# Patient Record
Sex: FEMALE | Race: WHITE | Employment: PART TIME | URBAN - METROPOLITAN AREA
[De-identification: names, ages, dates, MRNs, and addresses within clinical notes are randomized per-mention and may not be internally consistent; named-entity substitution may affect disease eponyms.]

---

## 2017-06-19 ENCOUNTER — HOSPITAL ENCOUNTER (OUTPATIENT)
Dept: WOMENS IMAGING | Age: 47
Discharge: HOME | End: 2017-06-19
Payer: COMMERCIAL

## 2017-06-19 ENCOUNTER — HOSPITAL ENCOUNTER (OUTPATIENT)
Dept: ULTRASOUND IMAGING | Age: 47
Discharge: HOME | End: 2017-06-19
Payer: COMMERCIAL

## 2017-06-19 DIAGNOSIS — R92.8 ABNORMAL MAMMOGRAM: ICD-10-CM

## 2017-06-19 DIAGNOSIS — R92.0 ABNORMAL MAMMOGRAM WITH MICROCALCIFICATION: ICD-10-CM

## 2017-06-19 PROCEDURE — 76642 ULTRASOUND BREAST LIMITED: CPT

## 2017-06-19 PROCEDURE — G0206 DX MAMMO INCL CAD UNI: HCPCS

## 2017-07-05 ENCOUNTER — OFFICE VISIT (OUTPATIENT)
Dept: SURGERY | Age: 47
End: 2017-07-05

## 2017-07-05 VITALS
DIASTOLIC BLOOD PRESSURE: 64 MMHG | WEIGHT: 142 LBS | HEIGHT: 67 IN | TEMPERATURE: 98.8 F | BODY MASS INDEX: 22.29 KG/M2 | SYSTOLIC BLOOD PRESSURE: 112 MMHG

## 2017-07-05 DIAGNOSIS — R92.0 ABNORMAL MAMMOGRAM WITH MICROCALCIFICATION: ICD-10-CM

## 2017-07-05 DIAGNOSIS — R92.8 ABNORMAL MAMMOGRAM: ICD-10-CM

## 2017-07-05 DIAGNOSIS — Z12.31 SCREENING MAMMOGRAM, ENCOUNTER FOR: Primary | ICD-10-CM

## 2017-07-05 PROCEDURE — 99212 OFFICE O/P EST SF 10 MIN: CPT | Performed by: SURGERY

## 2017-07-05 RX ORDER — ACETAMINOPHEN 160 MG
4000 TABLET,DISINTEGRATING ORAL
COMMUNITY

## 2017-10-12 ENCOUNTER — TELEPHONE (OUTPATIENT)
Dept: SURGERY | Age: 47
End: 2017-10-12

## 2017-10-13 NOTE — TELEPHONE ENCOUNTER
She had a left breast mammogram in June which was category 3 and stable. I had suggested a bilateral mammogram in January to get her back on a regular annual schedule. I don't think it is a problem to wait until June 2018 as the left mamm in June this year was stable.

## 2017-10-18 NOTE — TELEPHONE ENCOUNTER
Patient needs letter sent for the [facility] (they pay for her mammograms) stating that she is not due for her annual mammogram until July 2018. Please advise.

## 2018-07-11 ENCOUNTER — HOSPITAL ENCOUNTER (OUTPATIENT)
Dept: WOMENS IMAGING | Age: 48
Discharge: HOME | End: 2018-07-13
Payer: COMMERCIAL

## 2018-07-11 DIAGNOSIS — Z12.31 SCREENING MAMMOGRAM, ENCOUNTER FOR: ICD-10-CM

## 2018-07-11 PROCEDURE — 77067 SCR MAMMO BI INCL CAD: CPT

## 2018-07-25 ENCOUNTER — OFFICE VISIT (OUTPATIENT)
Dept: SURGERY | Age: 48
End: 2018-07-25
Payer: COMMERCIAL

## 2018-07-25 VITALS
SYSTOLIC BLOOD PRESSURE: 116 MMHG | DIASTOLIC BLOOD PRESSURE: 80 MMHG | WEIGHT: 145 LBS | TEMPERATURE: 99.1 F | HEIGHT: 66 IN | BODY MASS INDEX: 23.3 KG/M2

## 2018-07-25 DIAGNOSIS — N60.19 FIBROCYSTIC BREAST DISEASE (FCBD), UNSPECIFIED LATERALITY: Primary | ICD-10-CM

## 2018-07-25 PROCEDURE — 99212 OFFICE O/P EST SF 10 MIN: CPT | Performed by: SURGERY

## 2018-07-25 RX ORDER — KETOCONAZOLE 20 MG/ML
SHAMPOO TOPICAL
Refills: 5 | COMMUNITY
Start: 2018-07-21

## 2019-05-29 DIAGNOSIS — Z12.31 SCREENING MAMMOGRAM, ENCOUNTER FOR: Primary | ICD-10-CM

## 2019-06-02 ENCOUNTER — HOSPITAL ENCOUNTER (EMERGENCY)
Age: 49
Discharge: HOME | End: 2019-06-02

## 2019-06-02 VITALS
HEIGHT: 67 IN | WEIGHT: 145 LBS | BODY MASS INDEX: 22.76 KG/M2 | OXYGEN SATURATION: 97 % | RESPIRATION RATE: 18 BRPM | SYSTOLIC BLOOD PRESSURE: 120 MMHG | TEMPERATURE: 98.5 F | DIASTOLIC BLOOD PRESSURE: 80 MMHG | HEART RATE: 74 BPM

## 2019-06-02 DIAGNOSIS — J02.9 ACUTE PHARYNGITIS, UNSPECIFIED ETIOLOGY: Primary | ICD-10-CM

## 2019-06-02 LAB — S PYO AG THROAT QL: NEGATIVE

## 2019-06-02 PROCEDURE — 87081 CULTURE SCREEN ONLY: CPT

## 2019-06-02 PROCEDURE — 99282 EMERGENCY DEPT VISIT SF MDM: CPT

## 2019-06-02 PROCEDURE — 87880 STREP A ASSAY W/OPTIC: CPT

## 2019-06-02 RX ORDER — AZITHROMYCIN 250 MG/1
TABLET, FILM COATED ORAL
Qty: 1 PACKET | Refills: 0 | Status: SHIPPED | OUTPATIENT
Start: 2019-06-02 | End: 2019-06-12

## 2019-06-02 ASSESSMENT — ENCOUNTER SYMPTOMS
WHEEZING: 0
NAUSEA: 0
SORE THROAT: 1
FACIAL SWELLING: 0
RHINORRHEA: 0
SINUS PRESSURE: 0
ABDOMINAL DISTENTION: 0
VOMITING: 0
STRIDOR: 0
EYE DISCHARGE: 0
VOICE CHANGE: 0
COUGH: 0
ABDOMINAL PAIN: 0
COLOR CHANGE: 0
SINUS PAIN: 0
TROUBLE SWALLOWING: 0
CONSTIPATION: 0
SHORTNESS OF BREATH: 0

## 2019-06-02 ASSESSMENT — PAIN SCALES - GENERAL: PAINLEVEL_OUTOF10: 2

## 2019-06-02 NOTE — ED PROVIDER NOTES
numbness and headaches. Psychiatric/Behavioral: Negative for agitation and confusion. Except as noted above the remainder of the review of systems was reviewed and negative. PAST MEDICAL HISTORY   No past medical history on file. SURGICALHISTORY       Past Surgical History:   Procedure Laterality Date    LAPAROSCOPY      LIVER BIOPSY      TUBAL LIGATION           CURRENT MEDICATIONS       Discharge Medication List as of 6/2/2019  8:36 AM      CONTINUE these medications which have NOT CHANGED    Details   ketoconazole (NIZORAL) 2 % shampoo USE UTD BID, R-5, Historical Med      Multiple Vitamins-Minerals (MULTIVITAMIN PO) Take by mouthHistorical Med      Cholecalciferol (VITAMIN D3) 2000 units CAPS Take 4,000 Units by mouthHistorical Med      Probiotic Product (PROBIOTIC PO) Take by mouthHistorical Med             ALLERGIES     Avelox [moxifloxacin]; Keflex [cephalexin]; and Pseudoephedrine    FAMILY HISTORY     No family history on file.        SOCIAL HISTORY       Social History     Socioeconomic History    Marital status:      Spouse name: Not on file    Number of children: Not on file    Years of education: Not on file    Highest education level: Not on file   Occupational History    Not on file   Social Needs    Financial resource strain: Not on file    Food insecurity:     Worry: Not on file     Inability: Not on file    Transportation needs:     Medical: Not on file     Non-medical: Not on file   Tobacco Use    Smoking status: Current Every Day Smoker    Smokeless tobacco: Never Used   Substance and Sexual Activity    Alcohol use: Not on file    Drug use: Not on file    Sexual activity: Not on file   Lifestyle    Physical activity:     Days per week: Not on file     Minutes per session: Not on file    Stress: Not on file   Relationships    Social connections:     Talks on phone: Not on file     Gets together: Not on file     Attends [employer]al service: Not on file the radiologist. Plain radiographic images are visualized and preliminarily interpreted by the emergency physician with the below findings:        Interpretation per the Radiologist below, if available at the time ofthis note:    No orders to display         ED BEDSIDE ULTRASOUND:   Performed by ED Physician - none    LABS:  Labs Reviewed   RAPID STREP SCREEN   CULTURE BETA STREP CONFIRM PLATE       All other labs were within normal range or not returned as of this dictation. EMERGENCY DEPARTMENT COURSE and DIFFERENTIAL DIAGNOSIS/MDM:   Vitals:    Vitals:    06/02/19 0815   BP: 120/80   Pulse: 74   Resp: 18   Temp: 98.5 °F (36.9 °C)   TempSrc: Oral   SpO2: 97%   Weight: 145 lb (65.8 kg)   Height: 5' 7\" (1.702 m)          MDM  Number of Diagnoses or Management Options  Acute pharyngitis, unspecified etiology:   Diagnosis management comments: Patient sent to the emergency department by her employer for strep tests that she's had ongoing sore throat for the last 3 days patient has patient care contacts and they were advised to be evaluated. Patient has mild posterior pharyngeal erythema without signs of exudates no signs of abscess uvula is midline. No difficulty swallowing or speaking. She was given a prescription for Zithromax and advised to follow up with regular family physician next 2-3 days for reevaluation. Patient states she does not respond well to amoxicillin and therefore she requested Zithromax for treatment. CRITICAL CARE TIME   Total Critical Care time was 0 minutes, excluding separately reportableprocedures. There was a high probability of clinicallysignificant/life threatening deterioration in the patient's condition which required my urgent intervention. CONSULTS:  None    PROCEDURES:  Unless otherwise noted below, none     Procedures    FINAL IMPRESSION      1.  Acute pharyngitis, unspecified etiology          DISPOSITION/PLAN   DISPOSITION Decision To Discharge 06/02/2019 08:30:31

## 2019-06-02 NOTE — ED TRIAGE NOTES
Pt  Sent in from work to have a strep test. Pt states that she has had strep in the past  But this does not seem  Like it. Uncomfortable to swallow and she states that she  Has seen a few  White patches. Pt feels more as if there is inflammation than soreness.    Respirations unlabored

## 2019-06-04 LAB — S PYO THROAT QL CULT: NORMAL

## 2019-07-24 ENCOUNTER — HOSPITAL ENCOUNTER (OUTPATIENT)
Dept: ULTRASOUND IMAGING | Age: 49
Discharge: HOME | End: 2019-07-26
Payer: COMMERCIAL

## 2019-07-24 ENCOUNTER — HOSPITAL ENCOUNTER (OUTPATIENT)
Dept: WOMENS IMAGING | Age: 49
Discharge: HOME | End: 2019-07-26
Payer: COMMERCIAL

## 2019-07-24 DIAGNOSIS — Z87.2 HISTORY OF DIMPLING OF BREAST SKIN: ICD-10-CM

## 2019-07-24 DIAGNOSIS — R23.4 CHANGE OF SKIN OF BREAST: ICD-10-CM

## 2019-07-24 DIAGNOSIS — Z12.31 SCREENING MAMMOGRAM, ENCOUNTER FOR: ICD-10-CM

## 2019-07-24 DIAGNOSIS — N64.4 PAIN OF LEFT BREAST: ICD-10-CM

## 2019-07-24 PROCEDURE — G0279 TOMOSYNTHESIS, MAMMO: HCPCS

## 2019-07-24 PROCEDURE — 76642 ULTRASOUND BREAST LIMITED: CPT

## 2019-07-25 ENCOUNTER — TELEPHONE (OUTPATIENT)
Dept: SURGERY | Age: 49
End: 2019-07-25

## 2019-07-26 ENCOUNTER — OFFICE VISIT (OUTPATIENT)
Dept: SURGERY | Age: 49
End: 2019-07-26
Payer: MEDICAID

## 2019-07-26 VITALS
DIASTOLIC BLOOD PRESSURE: 82 MMHG | BODY MASS INDEX: 23.2 KG/M2 | TEMPERATURE: 98 F | WEIGHT: 147.8 LBS | HEIGHT: 67 IN | SYSTOLIC BLOOD PRESSURE: 110 MMHG

## 2019-07-26 DIAGNOSIS — R92.8 ABNORMAL ULTRASOUND OF BREAST: Primary | ICD-10-CM

## 2019-07-26 PROCEDURE — 99213 OFFICE O/P EST LOW 20 MIN: CPT | Performed by: SURGERY

## 2019-07-26 RX ORDER — HYDROCORTISONE ACETATE 25 MG/1
SUPPOSITORY RECTAL
Refills: 0 | COMMUNITY
Start: 2019-05-30

## 2019-07-29 ENCOUNTER — HOSPITAL ENCOUNTER (OUTPATIENT)
Dept: ULTRASOUND IMAGING | Age: 49
Discharge: HOME | End: 2019-07-31
Payer: COMMERCIAL

## 2019-07-29 ENCOUNTER — PREP FOR PROCEDURE (OUTPATIENT)
Dept: WOMENS IMAGING | Age: 49
End: 2019-07-29

## 2019-07-29 VITALS — SYSTOLIC BLOOD PRESSURE: 111 MMHG | HEART RATE: 80 BPM | DIASTOLIC BLOOD PRESSURE: 73 MMHG | RESPIRATION RATE: 20 BRPM

## 2019-07-29 DIAGNOSIS — R92.8 ABNORMAL ULTRASOUND OF BREAST: ICD-10-CM

## 2019-07-29 PROCEDURE — 2500000003 HC RX 250 WO HCPCS: Performed by: SURGERY

## 2019-07-29 PROCEDURE — 88305 TISSUE EXAM BY PATHOLOGIST: CPT

## 2019-07-29 PROCEDURE — 2580000003 HC RX 258: Performed by: SURGERY

## 2019-07-29 PROCEDURE — 2709999900 US BREAST BIOPSY W LOC DEVICE 1ST LESION LEFT

## 2019-07-29 RX ORDER — LIDOCAINE HYDROCHLORIDE 20 MG/ML
20 INJECTION, SOLUTION INFILTRATION; PERINEURAL ONCE
Status: COMPLETED | OUTPATIENT
Start: 2019-07-29 | End: 2019-07-29

## 2019-07-29 RX ORDER — LIDOCAINE HYDROCHLORIDE 20 MG/ML
20 INJECTION, SOLUTION INFILTRATION; PERINEURAL ONCE
OUTPATIENT
Start: 2019-07-29 | End: 2019-07-29

## 2019-07-29 RX ORDER — SODIUM CHLORIDE 9 MG/ML
INJECTION, SOLUTION INTRAVENOUS CONTINUOUS
OUTPATIENT
Start: 2019-07-29

## 2019-07-29 RX ORDER — MAGNESIUM HYDROXIDE 1200 MG/15ML
250 LIQUID ORAL CONTINUOUS
Status: DISCONTINUED | OUTPATIENT
Start: 2019-07-29 | End: 2019-08-01 | Stop reason: HOSPADM

## 2019-07-29 RX ADMIN — LIDOCAINE HYDROCHLORIDE 20 ML: 20 INJECTION, SOLUTION INFILTRATION; PERINEURAL at 15:00

## 2019-07-29 RX ADMIN — SODIUM CHLORIDE 250 ML: 900 IRRIGANT IRRIGATION at 14:46

## 2019-07-29 ASSESSMENT — PAIN SCALES - GENERAL: PAINLEVEL_OUTOF10: 0

## 2019-08-01 ENCOUNTER — TELEPHONE (OUTPATIENT)
Dept: SURGERY | Age: 49
End: 2019-08-01

## 2019-08-05 ENCOUNTER — OFFICE VISIT (OUTPATIENT)
Dept: SURGERY | Age: 49
End: 2019-08-05

## 2019-08-05 ENCOUNTER — TELEPHONE (OUTPATIENT)
Dept: SURGERY | Age: 49
End: 2019-08-05

## 2019-08-05 VITALS
BODY MASS INDEX: 23.64 KG/M2 | SYSTOLIC BLOOD PRESSURE: 120 MMHG | DIASTOLIC BLOOD PRESSURE: 80 MMHG | HEIGHT: 67 IN | TEMPERATURE: 98.4 F | WEIGHT: 150.6 LBS

## 2019-08-05 DIAGNOSIS — R92.8 ABNORMAL ULTRASOUND OF BREAST: Primary | ICD-10-CM

## 2019-08-05 PROCEDURE — 99024 POSTOP FOLLOW-UP VISIT: CPT | Performed by: SURGERY

## 2019-08-05 NOTE — LETTER
Cecy Arias MD  [address]  [facility], [address]  Phone: [phone]  Fax: [phone]      Re: Sydney Mast  : 1970      To Whom It May Concern:      Donovan Ashby was here today for an appointment with Cecy Arias MD.    Please call with any questions or concerns.     Thank you,        Cecy Arias MD  Electronically signed by Lebron Libman, 11:04 AM 19

## 2019-08-12 ENCOUNTER — OFFICE VISIT (OUTPATIENT)
Dept: SURGERY | Age: 49
End: 2019-08-12
Payer: MEDICAID

## 2019-08-12 VITALS
RESPIRATION RATE: 18 BRPM | BODY MASS INDEX: 23.89 KG/M2 | DIASTOLIC BLOOD PRESSURE: 66 MMHG | HEIGHT: 67 IN | SYSTOLIC BLOOD PRESSURE: 121 MMHG | HEART RATE: 85 BPM | WEIGHT: 152.2 LBS

## 2019-08-12 DIAGNOSIS — N63.20 LEFT BREAST MASS: Primary | ICD-10-CM

## 2019-08-12 DIAGNOSIS — R92.8 ABNORMAL MAMMOGRAM OF LEFT BREAST: ICD-10-CM

## 2019-08-12 PROCEDURE — 19083 BX BREAST 1ST LESION US IMAG: CPT | Performed by: SURGERY

## 2019-08-12 PROCEDURE — 99215 OFFICE O/P EST HI 40 MIN: CPT | Performed by: SURGERY

## 2019-08-12 PROCEDURE — 76642 ULTRASOUND BREAST LIMITED: CPT | Performed by: SURGERY

## 2019-08-12 RX ADMIN — Medication 2 MEQ: at 10:25

## 2019-08-12 RX ADMIN — LIDOCAINE HYDROCHLORIDE 10 ML: 10 INJECTION, SOLUTION EPIDURAL; INFILTRATION; INTRACAUDAL; PERINEURAL at 10:25

## 2019-08-12 ASSESSMENT — ENCOUNTER SYMPTOMS
VOMITING: 0
SHORTNESS OF BREATH: 0
NAUSEA: 0
SORE THROAT: 0
CHEST TIGHTNESS: 0
COLOR CHANGE: 0
ABDOMINAL PAIN: 0
COUGH: 0

## 2019-08-12 NOTE — PATIENT INSTRUCTIONS
picture of the breast can be seen on a video monitor. · A needle or tiny probe is put through your skin into your breast tissue. · If the lump is a cyst, the needle will take out fluid. If the lump is solid, the needle will take a sample of tissue. · The needle is removed and pressure put on the needle site to stop any bleeding. The area is covered with a bandage. What else should you know about the test?  · Ultrasound is painless and does not use radiation. · You will feel only a quick sting from the needle if you have a local anesthetic to numb the biopsy area. You may feel some pressure when the biopsy needle is put in. How long does the test take? · The test usually takes about 15 minutes. This depends on how many biopsy samples are needed. What happens after the test?  · You'll be told how long it may take to get your results back. · You will probably be able to go home right away. · You can go back to your usual activities right away, but avoid heavy lifting for 24 hours. · The site may be tender for 2 or 3 days. You may also have some bruising, swelling, or slight bleeding. ? You can use an ice pack. Put ice or a cold pack on the area for 10 to 20 minutes at a time. Put a thin cloth between the ice and your skin. ? Ask your doctor if you can take an over-the-counter pain medicine, such as acetaminophen (Tylenol), ibuprofen (Advil, Motrin), or naproxen (Aleve). Be safe with medicines. Read and follow all instructions on the label. · After a specialist looks at the biopsy sample for signs of cancer, your doctor's office will let you know the results. · If the test results are not clear, you may have another biopsy or test.  Follow-up care is a key part of your treatment and safety. Be sure to make and go to all appointments, and call your doctor if you are having problems. It's also a good idea to keep a list of the medicines you take.  Ask your doctor when you can expect to have your test

## 2019-08-12 NOTE — PROGRESS NOTES
Needs    Financial resource strain: Not on file    Food insecurity:     Worry: Not on file     Inability: Not on file    Transportation needs:     Medical: Not on file     Non-medical: Not on file   Tobacco Use    Smoking status: Former Smoker     Packs/day: 0.50     Years: 30.00     Pack years: 15.00     Last attempt to quit: 2019     Years since quittin.2    Smokeless tobacco: Never Used   Substance and Sexual Activity    Alcohol use: Yes     Comment: Occassional    Drug use: Never    Sexual activity: Yes     Partners: Male   Lifestyle    Physical activity:     Days per week: Not on file     Minutes per session: Not on file    Stress: Not on file   Relationships    Social connections:     Talks on phone: Not on file     Gets together: Not on file     Attends Zoroastrianism service: Not on file     Active member of club or organization: Not on file     Attends meetings of clubs or organizations: Not on file     Relationship status: Not on file    Intimate partner violence:     Fear of current or ex partner: Not on file     Emotionally abused: Not on file     Physically abused: Not on file     Forced sexual activity: Not on file   Other Topics Concern    Not on file   Social History Narrative    Not on file     Goes to the [facility] because the patient does not have insurance  Review of Systems   Constitutional: Negative for activity change, appetite change, chills, diaphoresis, fatigue, fever and unexpected weight change. HENT: Negative for congestion, ear pain, hearing loss, mouth sores, nosebleeds and sore throat. Respiratory: Negative for cough, chest tightness and shortness of breath. Cardiovascular: Negative for chest pain, palpitations and leg swelling. Gastrointestinal: Negative for abdominal pain, nausea and vomiting. Endocrine: Negative for cold intolerance, heat intolerance, polydipsia, polyphagia and polyuria.    Genitourinary: Negative for difficulty urinating, menstrual problem and vaginal bleeding. Musculoskeletal: Negative for neck pain and neck stiffness. Skin: Negative for color change, pallor, rash and wound. Allergic/Immunologic: Negative for environmental allergies and immunocompromised state. Neurological: Negative for weakness. Hematological: Does not bruise/bleed easily. Psychiatric/Behavioral: Negative for agitation, confusion, sleep disturbance and suicidal ideas. The patient is nervous/anxious (depression and anxiety). Have you ever tested positive for AIDS? no  Have you ever tested positive for Hepatitis? 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 She has a 15.00 pack-year smoking history. She has never used smokeless tobacco.  Alcohol use: Norbert Woods  reports that she drinks alcohol. Drug use: Norbert Woods  reports that she does not use drugs. Caffeine intake: 0 cups of caffeinated coffee per day(s)  Exercise:  very active    /66 (Site: Right Upper Arm)   Pulse 85   Resp 18   Ht 5' 7\" (1.702 m)   Wt 152 lb 3.2 oz (69 kg)   LMP 07/10/2019   BMI 23.84 kg/m²     Physical Exam   Constitutional: She is oriented to person, place, and time. She appears well-developed and well-nourished. She is cooperative. HENT:   Head: Normocephalic and atraumatic. Nose: Nose normal.   Eyes: Conjunctivae are normal. Right conjunctiva has no hemorrhage. Left conjunctiva has no hemorrhage. Neck: Normal range of motion. Neck supple. Cardiovascular: Normal rate. Pulmonary/Chest: Effort normal. No respiratory distress. Right breast exhibits no inverted nipple, no mass, no nipple discharge, no skin change and no tenderness. Left breast exhibits mass (1.5 cm mass at 5 oclock 3 cm from nipple, irregular with skin retraction, ecchymosis from previous biopsy) and skin change.  Left breast

## 2019-08-13 ENCOUNTER — TELEPHONE (OUTPATIENT)
Dept: SURGERY | Age: 49
End: 2019-08-13

## 2019-08-13 RX ORDER — LIDOCAINE HYDROCHLORIDE 10 MG/ML
10 INJECTION, SOLUTION EPIDURAL; INFILTRATION; INTRACAUDAL; PERINEURAL ONCE
Status: COMPLETED | OUTPATIENT
Start: 2019-08-13 | End: 2019-08-12

## 2019-08-13 NOTE — PROGRESS NOTES
A timeout was performed immediately prior to the start of the left breast core biopsy and clip placement procedure and included the correct patient (two identifiers), correct procedure and correct site(s). Procedure consent and allergies were also verified.

## 2019-08-14 ENCOUNTER — TELEPHONE (OUTPATIENT)
Dept: SURGERY | Age: 49
End: 2019-08-14

## 2019-08-15 ENCOUNTER — TELEPHONE (OUTPATIENT)
Dept: SURGERY | Age: 49
End: 2019-08-15

## 2019-08-15 NOTE — TELEPHONE ENCOUNTER
Spoke to patient yesterday in detail. Offered Friday am appointment for possible surgery next week. Patient declined and wants next week. The patient has issues with work.

## 2019-08-21 ENCOUNTER — OFFICE VISIT (OUTPATIENT)
Dept: SURGERY | Age: 49
End: 2019-08-21
Payer: MEDICAID

## 2019-08-21 ENCOUNTER — TELEPHONE (OUTPATIENT)
Dept: SURGERY | Age: 49
End: 2019-08-21

## 2019-08-21 VITALS
HEART RATE: 83 BPM | SYSTOLIC BLOOD PRESSURE: 132 MMHG | BODY MASS INDEX: 23.79 KG/M2 | DIASTOLIC BLOOD PRESSURE: 60 MMHG | WEIGHT: 151.6 LBS | HEIGHT: 67 IN | RESPIRATION RATE: 18 BRPM

## 2019-08-21 DIAGNOSIS — Z17.0 CARCINOMA OF LOWER-OUTER QUADRANT OF LEFT BREAST IN FEMALE, ESTROGEN RECEPTOR POSITIVE (HCC): Primary | ICD-10-CM

## 2019-08-21 DIAGNOSIS — C50.512 CARCINOMA OF LOWER-OUTER QUADRANT OF LEFT BREAST IN FEMALE, ESTROGEN RECEPTOR POSITIVE (HCC): Primary | ICD-10-CM

## 2019-08-21 PROCEDURE — 99215 OFFICE O/P EST HI 40 MIN: CPT | Performed by: SURGERY

## 2019-08-21 NOTE — PROGRESS NOTES
CANCER TALK    PATIENT:  Nimisha Gonzalez    DATE:     8/21/19    SURGICAL DISCUSSION:    Enio Hart is a 54y.o. year old  female who presents for a discussion left breast cancer. We underwent a description of the pathology of her tumor, the clinical stage, her treatment options of breast conservation versus simple mastectomy and sentinel lymph node biopsy. These were all discussed with her. The patient is aware that by having a sentinel lymph node if the sentinel lymph node is negative on frozen section and the final pathology is positive she will need to return to the Operating Room. She is also aware there is a 5% chance that the sentinel lymph node may not be identified at surgery and that she may need to have a completion axillary dissection. There is a 3-5% chance of a false/negative finding on sentinel lymph node biopsy. The indications for chemotherapy and radiation therapy were also discussed. The patient has undergone explanation of the complications of the procedures which are including but not limited to bleeding, infection, nerve injury and lymphedema. The variation in lymphedema rates between sentinel lymph node biopsy and the completion axillary dissection were discussed. The patient is aware that if she meets all the criteria of having clear margins that the survival and local recurrence rate for mastectomy and breast conservation are the same. The potential risk of local recurrence is 5-6%. She is aware that if her margins return positive on the lumpectomy specimen that she would need to have either a re-excision for margins or a completion mastectomy. Multiple questions were answered and the patient wanted to schedule a lumpectomy and sentinel lymph node biopsy at the next available time. Greater than 50% of the time spent was reviewing management options with the patient. Approximately 60 minutes was spent with the patient and family.       IMPRESSION:    Cancer

## 2019-08-22 ENCOUNTER — TELEPHONE (OUTPATIENT)
Dept: SURGERY | Age: 49
End: 2019-08-22

## 2019-08-26 ENCOUNTER — NURSE ONLY (OUTPATIENT)
Dept: SURGERY | Age: 49
End: 2019-08-26
Payer: MEDICAID

## 2019-08-26 ENCOUNTER — HOSPITAL ENCOUNTER (OUTPATIENT)
Dept: OCCUPATIONAL THERAPY | Age: 49
Setting detail: THERAPIES SERIES
Discharge: HOME | End: 2019-08-26
Payer: MEDICAID

## 2019-08-26 ENCOUNTER — OFFICE VISIT (OUTPATIENT)
Dept: FAMILY MEDICINE CLINIC | Age: 49
End: 2019-08-26
Payer: MEDICAID

## 2019-08-26 VITALS
HEART RATE: 86 BPM | DIASTOLIC BLOOD PRESSURE: 68 MMHG | SYSTOLIC BLOOD PRESSURE: 119 MMHG | WEIGHT: 152.8 LBS | HEIGHT: 67 IN | BODY MASS INDEX: 23.98 KG/M2 | RESPIRATION RATE: 18 BRPM

## 2019-08-26 VITALS
HEART RATE: 79 BPM | DIASTOLIC BLOOD PRESSURE: 70 MMHG | RESPIRATION RATE: 16 BRPM | WEIGHT: 151 LBS | OXYGEN SATURATION: 99 % | TEMPERATURE: 98.4 F | SYSTOLIC BLOOD PRESSURE: 112 MMHG | HEIGHT: 67 IN | BODY MASS INDEX: 23.7 KG/M2

## 2019-08-26 DIAGNOSIS — C50.512 CARCINOMA OF LOWER-OUTER QUADRANT OF LEFT BREAST IN FEMALE, ESTROGEN RECEPTOR POSITIVE (HCC): ICD-10-CM

## 2019-08-26 DIAGNOSIS — Z17.0 CARCINOMA OF LOWER-OUTER QUADRANT OF LEFT BREAST IN FEMALE, ESTROGEN RECEPTOR POSITIVE (HCC): Primary | ICD-10-CM

## 2019-08-26 DIAGNOSIS — Z01.818 PREOPERATIVE EXAMINATION: Primary | ICD-10-CM

## 2019-08-26 DIAGNOSIS — Z01.818 PREOPERATIVE EXAMINATION: ICD-10-CM

## 2019-08-26 DIAGNOSIS — Z17.0 CARCINOMA OF LOWER-OUTER QUADRANT OF LEFT BREAST IN FEMALE, ESTROGEN RECEPTOR POSITIVE (HCC): ICD-10-CM

## 2019-08-26 DIAGNOSIS — C50.512 CARCINOMA OF LOWER-OUTER QUADRANT OF LEFT BREAST IN FEMALE, ESTROGEN RECEPTOR POSITIVE (HCC): Primary | ICD-10-CM

## 2019-08-26 LAB
ALBUMIN SERPL-MCNC: 4.2 G/DL (ref 3.5–4.6)
ALP BLD-CCNC: 55 U/L (ref 40–130)
ALT SERPL-CCNC: 7 U/L (ref 0–33)
ANION GAP SERPL CALCULATED.3IONS-SCNC: 15 MEQ/L (ref 9–15)
APTT: 30.5 SEC (ref 24.4–36.8)
AST SERPL-CCNC: 14 U/L (ref 0–35)
BACTERIA: NEGATIVE /HPF
BASOPHILS ABSOLUTE: 0.1 K/UL (ref 0–0.2)
BASOPHILS RELATIVE PERCENT: 1.4 %
BILIRUB SERPL-MCNC: 0.8 MG/DL (ref 0.2–0.7)
BILIRUBIN URINE: NEGATIVE
BLOOD, URINE: ABNORMAL
BUN BLDV-MCNC: 17 MG/DL (ref 6–20)
CALCIUM SERPL-MCNC: 8.8 MG/DL (ref 8.5–9.9)
CHLORIDE BLD-SCNC: 102 MEQ/L (ref 95–107)
CLARITY: CLEAR
CO2: 22 MEQ/L (ref 20–31)
COLOR: YELLOW
CREAT SERPL-MCNC: 0.61 MG/DL (ref 0.5–0.9)
EOSINOPHILS ABSOLUTE: 0.1 K/UL (ref 0–0.7)
EOSINOPHILS RELATIVE PERCENT: 1 %
EPITHELIAL CELLS, UA: ABNORMAL /HPF (ref 0–5)
GFR AFRICAN AMERICAN: >60
GFR NON-AFRICAN AMERICAN: >60
GLOBULIN: 3.1 G/DL (ref 2.3–3.5)
GLUCOSE BLD-MCNC: 90 MG/DL (ref 70–99)
GLUCOSE URINE: NEGATIVE MG/DL
HCG(URINE) PREGNANCY TEST: NEGATIVE
HCT VFR BLD CALC: 37.6 % (ref 37–47)
HEMOGLOBIN: 12.4 G/DL (ref 12–16)
HYALINE CASTS: ABNORMAL /HPF (ref 0–5)
INR BLD: 0.9
KETONES, URINE: NEGATIVE MG/DL
LEUKOCYTE ESTERASE, URINE: NEGATIVE
LYMPHOCYTES ABSOLUTE: 1.5 K/UL (ref 1–4.8)
LYMPHOCYTES RELATIVE PERCENT: 27.7 %
MCH RBC QN AUTO: 31.8 PG (ref 27–31.3)
MCHC RBC AUTO-ENTMCNC: 33 % (ref 33–37)
MCV RBC AUTO: 96.5 FL (ref 82–100)
MONOCYTES ABSOLUTE: 0.4 K/UL (ref 0.2–0.8)
MONOCYTES RELATIVE PERCENT: 7.7 %
NEUTROPHILS ABSOLUTE: 3.4 K/UL (ref 1.4–6.5)
NEUTROPHILS RELATIVE PERCENT: 62.2 %
NITRITE, URINE: NEGATIVE
PDW BLD-RTO: 13.5 % (ref 11.5–14.5)
PH UA: 5.5 (ref 5–9)
PLATELET # BLD: 386 K/UL (ref 130–400)
POTASSIUM SERPL-SCNC: 4.1 MEQ/L (ref 3.4–4.9)
PROTEIN UA: NEGATIVE MG/DL
PROTHROMBIN TIME: 12.8 SEC (ref 12.3–14.9)
RBC # BLD: 3.9 M/UL (ref 4.2–5.4)
RBC UA: ABNORMAL /HPF (ref 0–5)
SODIUM BLD-SCNC: 139 MEQ/L (ref 135–144)
SPECIFIC GRAVITY UA: 1.03 (ref 1–1.03)
TOTAL PROTEIN: 7.3 G/DL (ref 6.3–8)
URINE REFLEX TO CULTURE: YES
UROBILINOGEN, URINE: 0.2 E.U./DL
WBC # BLD: 5.5 K/UL (ref 4.8–10.8)
WBC UA: ABNORMAL /HPF (ref 0–5)

## 2019-08-26 PROCEDURE — 93000 ELECTROCARDIOGRAM COMPLETE: CPT | Performed by: NURSE PRACTITIONER

## 2019-08-26 PROCEDURE — 97166 OT EVAL MOD COMPLEX 45 MIN: CPT

## 2019-08-26 PROCEDURE — 99242 OFF/OP CONSLTJ NEW/EST SF 20: CPT | Performed by: NURSE PRACTITIONER

## 2019-08-26 ASSESSMENT — PATIENT HEALTH QUESTIONNAIRE - PHQ9
SUM OF ALL RESPONSES TO PHQ9 QUESTIONS 1 & 2: 1
1. LITTLE INTEREST OR PLEASURE IN DOING THINGS: 0
SUM OF ALL RESPONSES TO PHQ QUESTIONS 1-9: 1
SUM OF ALL RESPONSES TO PHQ QUESTIONS 1-9: 1
2. FEELING DOWN, DEPRESSED OR HOPELESS: 1

## 2019-08-26 NOTE — PROGRESS NOTES
08/26/2019    LABGLOM >60.0 08/26/2019    GFRAA >60.0 08/26/2019    GLOB 3.1 08/26/2019       Lab Results   Component Value Date    WBC 5.5 08/26/2019    HGB 12.4 08/26/2019    HCT 37.6 08/26/2019    MCV 96.5 08/26/2019     08/26/2019     Lab Results   Component Value Date    APTT 30.5 08/26/2019     Lab Results   Component Value Date    INR 0.9 08/26/2019    INR 0.9 06/29/2015    PROTIME 12.8 08/26/2019    PROTIME 10.0 06/29/2015        Assessment:       54 y.o. patient with planned surgery as above. Known risk factors for perioperative complications: None, prior history of tobacco use  Current medications which may produce withdrawal symptoms if withheld perioperatively: none      Plan:     1. Preoperative workup as follows: chest x-ray, ECG, hemoglobin, hematocrit, electrolytes, creatinine, glucose, liver function studies, coagulation studies, urinalysis  2. Change in medication regimen before surgery: Hold all medications on morning of surgery  3. Prophylaxis for cardiac events with perioperative beta-blockers: Not indicated  ACC/AHA indications for pre-operative beta-blocker use:    · Vascular surgery with history of postitive stress test  · Intermediate or high risk surgery with history of CAD   · Intermediate or high risk surgery with multiple clinical predictors of CAD- 2 of the following: history of compensated or prior heart failure, history of cerebrovascular disease, DM, or renal insufficiency    Routine administration of higher-dose, long-acting metoprolol in beta-blocker-naïve patients on the day of surgery, and in the absence of dose titration is associated with an overall increase in mortality. Beta-blockers should be started days to weeks prior to surgery and titrated to pulse < 70.  4. Deep vein thrombosis prophylaxis: regimen to be chosen by surgical team  5.  No contraindications to planned surgery- PENDING urine culture result    JOVANY Harper

## 2019-08-28 LAB — URINE CULTURE, ROUTINE: NORMAL

## 2019-08-29 ENCOUNTER — ANESTHESIA EVENT (OUTPATIENT)
Dept: OPERATING ROOM | Age: 49
End: 2019-08-29
Payer: MEDICAID

## 2019-08-29 ENCOUNTER — HOSPITAL ENCOUNTER (OUTPATIENT)
Dept: NUCLEAR MEDICINE | Age: 49
Discharge: HOME | End: 2019-08-31
Payer: MEDICAID

## 2019-08-29 ENCOUNTER — ANESTHESIA (OUTPATIENT)
Dept: OPERATING ROOM | Age: 49
End: 2019-08-29
Payer: MEDICAID

## 2019-08-29 ENCOUNTER — HOSPITAL ENCOUNTER (OUTPATIENT)
Age: 49
Setting detail: OUTPATIENT SURGERY
Discharge: HOME | End: 2019-08-29
Attending: SURGERY | Admitting: SURGERY
Payer: MEDICAID

## 2019-08-29 VITALS — SYSTOLIC BLOOD PRESSURE: 93 MMHG | DIASTOLIC BLOOD PRESSURE: 53 MMHG | OXYGEN SATURATION: 100 %

## 2019-08-29 VITALS
DIASTOLIC BLOOD PRESSURE: 58 MMHG | SYSTOLIC BLOOD PRESSURE: 99 MMHG | HEIGHT: 67 IN | TEMPERATURE: 97.4 F | HEART RATE: 66 BPM | BODY MASS INDEX: 23.54 KG/M2 | OXYGEN SATURATION: 97 % | RESPIRATION RATE: 16 BRPM | WEIGHT: 150 LBS

## 2019-08-29 DIAGNOSIS — C50.512 CARCINOMA OF LOWER-OUTER QUADRANT OF LEFT BREAST IN FEMALE, ESTROGEN RECEPTOR POSITIVE (HCC): ICD-10-CM

## 2019-08-29 DIAGNOSIS — Z17.0 CARCINOMA OF LOWER-OUTER QUADRANT OF LEFT BREAST IN FEMALE, ESTROGEN RECEPTOR POSITIVE (HCC): ICD-10-CM

## 2019-08-29 LAB
HCG, URINE, POC: NEGATIVE
Lab: NORMAL
NEGATIVE QC PASS/FAIL: NORMAL
POSITIVE QC PASS/FAIL: NORMAL

## 2019-08-29 PROCEDURE — 76942 ECHO GUIDE FOR BIOPSY: CPT | Performed by: ANESTHESIOLOGY

## 2019-08-29 PROCEDURE — 14301 TIS TRNFR ANY 30.1-60 SQ CM: CPT | Performed by: SURGERY

## 2019-08-29 PROCEDURE — 38500 BIOPSY/REMOVAL LYMPH NODES: CPT | Performed by: SURGERY

## 2019-08-29 PROCEDURE — 2580000003 HC RX 258: Performed by: ANESTHESIOLOGY

## 2019-08-29 PROCEDURE — 88307 TISSUE EXAM BY PATHOLOGIST: CPT

## 2019-08-29 PROCEDURE — 6360000002 HC RX W HCPCS: Performed by: ANESTHESIOLOGY

## 2019-08-29 PROCEDURE — 6370000000 HC RX 637 (ALT 250 FOR IP): Performed by: ANESTHESIOLOGY

## 2019-08-29 PROCEDURE — 38900 IO MAP OF SENT LYMPH NODE: CPT | Performed by: SURGERY

## 2019-08-29 PROCEDURE — 3600000014 HC SURGERY LEVEL 4 ADDTL 15MIN: Performed by: SURGERY

## 2019-08-29 PROCEDURE — 6360000002 HC RX W HCPCS: Performed by: NURSE ANESTHETIST, CERTIFIED REGISTERED

## 2019-08-29 PROCEDURE — 7100000011 HC PHASE II RECOVERY - ADDTL 15 MIN: Performed by: SURGERY

## 2019-08-29 PROCEDURE — 3430000000 HC RX DIAGNOSTIC RADIOPHARMACEUTICAL: Performed by: SURGERY

## 2019-08-29 PROCEDURE — 2709999900 HC NON-CHARGEABLE SUPPLY: Performed by: SURGERY

## 2019-08-29 PROCEDURE — 3600000004 HC SURGERY LEVEL 4 BASE: Performed by: SURGERY

## 2019-08-29 PROCEDURE — 6360000002 HC RX W HCPCS: Performed by: SURGERY

## 2019-08-29 PROCEDURE — 88342 IMHCHEM/IMCYTCHM 1ST ANTB: CPT

## 2019-08-29 PROCEDURE — 7100000010 HC PHASE II RECOVERY - FIRST 15 MIN: Performed by: SURGERY

## 2019-08-29 PROCEDURE — 7100000001 HC PACU RECOVERY - ADDTL 15 MIN: Performed by: SURGERY

## 2019-08-29 PROCEDURE — 3700000001 HC ADD 15 MINUTES (ANESTHESIA): Performed by: SURGERY

## 2019-08-29 PROCEDURE — 19301 PARTIAL MASTECTOMY: CPT | Performed by: SURGERY

## 2019-08-29 PROCEDURE — 2500000003 HC RX 250 WO HCPCS: Performed by: SURGERY

## 2019-08-29 PROCEDURE — 2580000003 HC RX 258: Performed by: SURGERY

## 2019-08-29 PROCEDURE — A9541 TC99M SULFUR COLLOID: HCPCS | Performed by: SURGERY

## 2019-08-29 PROCEDURE — 38792 RA TRACER ID OF SENTINL NODE: CPT

## 2019-08-29 PROCEDURE — 7100000000 HC PACU RECOVERY - FIRST 15 MIN: Performed by: SURGERY

## 2019-08-29 PROCEDURE — 2720000010 HC SURG SUPPLY STERILE: Performed by: SURGERY

## 2019-08-29 PROCEDURE — 3700000000 HC ANESTHESIA ATTENDED CARE: Performed by: SURGERY

## 2019-08-29 RX ORDER — DEXAMETHASONE SODIUM PHOSPHATE 10 MG/ML
INJECTION INTRAMUSCULAR; INTRAVENOUS PRN
Status: DISCONTINUED | OUTPATIENT
Start: 2019-08-29 | End: 2019-08-29 | Stop reason: SDUPTHER

## 2019-08-29 RX ORDER — PROPOFOL 10 MG/ML
INJECTION, EMULSION INTRAVENOUS PRN
Status: DISCONTINUED | OUTPATIENT
Start: 2019-08-29 | End: 2019-08-29 | Stop reason: SDUPTHER

## 2019-08-29 RX ORDER — METHYLENE BLUE 10 MG/ML
INJECTION INTRAVENOUS PRN
Status: DISCONTINUED | OUTPATIENT
Start: 2019-08-29 | End: 2019-08-29 | Stop reason: ALTCHOICE

## 2019-08-29 RX ORDER — MAGNESIUM HYDROXIDE 1200 MG/15ML
LIQUID ORAL PRN
Status: DISCONTINUED | OUTPATIENT
Start: 2019-08-29 | End: 2019-08-29 | Stop reason: ALTCHOICE

## 2019-08-29 RX ORDER — ONDANSETRON 2 MG/ML
INJECTION INTRAMUSCULAR; INTRAVENOUS PRN
Status: DISCONTINUED | OUTPATIENT
Start: 2019-08-29 | End: 2019-08-29 | Stop reason: SDUPTHER

## 2019-08-29 RX ORDER — FENTANYL CITRATE 50 UG/ML
50 INJECTION, SOLUTION INTRAMUSCULAR; INTRAVENOUS EVERY 10 MIN PRN
Status: DISCONTINUED | OUTPATIENT
Start: 2019-08-29 | End: 2019-08-29 | Stop reason: HOSPADM

## 2019-08-29 RX ORDER — HYDROCODONE BITARTRATE AND ACETAMINOPHEN 5; 325 MG/1; MG/1
2 TABLET ORAL PRN
Status: COMPLETED | OUTPATIENT
Start: 2019-08-29 | End: 2019-08-29

## 2019-08-29 RX ORDER — HYDROCODONE BITARTRATE AND ACETAMINOPHEN 5; 325 MG/1; MG/1
1 TABLET ORAL PRN
Status: COMPLETED | OUTPATIENT
Start: 2019-08-29 | End: 2019-08-29

## 2019-08-29 RX ORDER — ROPIVACAINE HYDROCHLORIDE 5 MG/ML
INJECTION, SOLUTION EPIDURAL; INFILTRATION; PERINEURAL PRN
Status: DISCONTINUED | OUTPATIENT
Start: 2019-08-29 | End: 2019-08-29 | Stop reason: SDUPTHER

## 2019-08-29 RX ORDER — SODIUM CHLORIDE, SODIUM LACTATE, POTASSIUM CHLORIDE, CALCIUM CHLORIDE 600; 310; 30; 20 MG/100ML; MG/100ML; MG/100ML; MG/100ML
INJECTION, SOLUTION INTRAVENOUS CONTINUOUS
Status: DISCONTINUED | OUTPATIENT
Start: 2019-08-29 | End: 2019-08-29 | Stop reason: HOSPADM

## 2019-08-29 RX ORDER — MIDAZOLAM HYDROCHLORIDE 1 MG/ML
1 INJECTION INTRAMUSCULAR; INTRAVENOUS ONCE
Status: COMPLETED | OUTPATIENT
Start: 2019-08-29 | End: 2019-08-29

## 2019-08-29 RX ORDER — FENTANYL CITRATE 50 UG/ML
INJECTION, SOLUTION INTRAMUSCULAR; INTRAVENOUS PRN
Status: DISCONTINUED | OUTPATIENT
Start: 2019-08-29 | End: 2019-08-29 | Stop reason: SDUPTHER

## 2019-08-29 RX ORDER — MEPERIDINE HYDROCHLORIDE 25 MG/ML
12.5 INJECTION INTRAMUSCULAR; INTRAVENOUS; SUBCUTANEOUS EVERY 5 MIN PRN
Status: DISCONTINUED | OUTPATIENT
Start: 2019-08-29 | End: 2019-08-29 | Stop reason: HOSPADM

## 2019-08-29 RX ORDER — MIDAZOLAM HYDROCHLORIDE 1 MG/ML
INJECTION INTRAMUSCULAR; INTRAVENOUS PRN
Status: DISCONTINUED | OUTPATIENT
Start: 2019-08-29 | End: 2019-08-29 | Stop reason: SDUPTHER

## 2019-08-29 RX ORDER — MAGNESIUM HYDROXIDE 1200 MG/15ML
LIQUID ORAL CONTINUOUS PRN
Status: COMPLETED | OUTPATIENT
Start: 2019-08-29 | End: 2019-08-29

## 2019-08-29 RX ORDER — DIPHENHYDRAMINE HYDROCHLORIDE 50 MG/ML
12.5 INJECTION INTRAMUSCULAR; INTRAVENOUS
Status: DISCONTINUED | OUTPATIENT
Start: 2019-08-29 | End: 2019-08-29 | Stop reason: HOSPADM

## 2019-08-29 RX ORDER — LIDOCAINE HYDROCHLORIDE 20 MG/ML
INJECTION, SOLUTION INTRAVENOUS PRN
Status: DISCONTINUED | OUTPATIENT
Start: 2019-08-29 | End: 2019-08-29 | Stop reason: SDUPTHER

## 2019-08-29 RX ORDER — ONDANSETRON 2 MG/ML
4 INJECTION INTRAMUSCULAR; INTRAVENOUS
Status: DISCONTINUED | OUTPATIENT
Start: 2019-08-29 | End: 2019-08-29 | Stop reason: HOSPADM

## 2019-08-29 RX ORDER — METOCLOPRAMIDE HYDROCHLORIDE 5 MG/ML
10 INJECTION INTRAMUSCULAR; INTRAVENOUS
Status: DISCONTINUED | OUTPATIENT
Start: 2019-08-29 | End: 2019-08-29 | Stop reason: HOSPADM

## 2019-08-29 RX ORDER — 0.9 % SODIUM CHLORIDE 0.9 %
VIAL (ML) INJECTION PRN
Status: DISCONTINUED | OUTPATIENT
Start: 2019-08-29 | End: 2019-08-29 | Stop reason: ALTCHOICE

## 2019-08-29 RX ORDER — CLINDAMYCIN PHOSPHATE 600 MG/50ML
600 INJECTION INTRAVENOUS
Status: COMPLETED | OUTPATIENT
Start: 2019-08-29 | End: 2019-08-29

## 2019-08-29 RX ADMIN — FENTANYL CITRATE 50 MCG: 50 INJECTION, SOLUTION INTRAMUSCULAR; INTRAVENOUS at 11:39

## 2019-08-29 RX ADMIN — PROPOFOL 150 MG: 10 INJECTION, EMULSION INTRAVENOUS at 09:23

## 2019-08-29 RX ADMIN — MIDAZOLAM HYDROCHLORIDE 2 MG: 1 INJECTION, SOLUTION INTRAMUSCULAR; INTRAVENOUS at 08:28

## 2019-08-29 RX ADMIN — MIDAZOLAM HYDROCHLORIDE 1 MG: 1 INJECTION, SOLUTION INTRAMUSCULAR; INTRAVENOUS at 11:31

## 2019-08-29 RX ADMIN — SODIUM CHLORIDE, POTASSIUM CHLORIDE, SODIUM LACTATE AND CALCIUM CHLORIDE: 600; 310; 30; 20 INJECTION, SOLUTION INTRAVENOUS at 10:51

## 2019-08-29 RX ADMIN — CLINDAMYCIN IN 5 PERCENT DEXTROSE 600 MG: 12 INJECTION, SOLUTION INTRAVENOUS at 09:18

## 2019-08-29 RX ADMIN — SODIUM CHLORIDE, POTASSIUM CHLORIDE, SODIUM LACTATE AND CALCIUM CHLORIDE 1000 ML: 600; 310; 30; 20 INJECTION, SOLUTION INTRAVENOUS at 08:18

## 2019-08-29 RX ADMIN — FENTANYL CITRATE 50 MCG: 50 INJECTION, SOLUTION INTRAMUSCULAR; INTRAVENOUS at 11:13

## 2019-08-29 RX ADMIN — FENTANYL CITRATE 50 MCG: 50 INJECTION, SOLUTION INTRAMUSCULAR; INTRAVENOUS at 11:09

## 2019-08-29 RX ADMIN — FENTANYL CITRATE 50 MCG: 50 INJECTION, SOLUTION INTRAMUSCULAR; INTRAVENOUS at 11:50

## 2019-08-29 RX ADMIN — DEXAMETHASONE SODIUM PHOSPHATE 10 MG: 10 INJECTION INTRAMUSCULAR; INTRAVENOUS at 09:35

## 2019-08-29 RX ADMIN — ONDANSETRON 4 MG: 2 INJECTION INTRAMUSCULAR; INTRAVENOUS at 10:56

## 2019-08-29 RX ADMIN — ROPIVACAINE HYDROCHLORIDE 30 ML: 5 INJECTION, SOLUTION EPIDURAL; INFILTRATION; PERINEURAL at 08:33

## 2019-08-29 RX ADMIN — HYDROCODONE BITARTRATE AND ACETAMINOPHEN 2 TABLET: 5; 325 TABLET ORAL at 11:44

## 2019-08-29 RX ADMIN — LIDOCAINE HYDROCHLORIDE 50 MG: 20 INJECTION, SOLUTION INTRAVENOUS at 09:23

## 2019-08-29 RX ADMIN — Medication 1.9 MILLICURIE: at 07:40

## 2019-08-29 ASSESSMENT — PULMONARY FUNCTION TESTS
PIF_VALUE: 12
PIF_VALUE: 0
PIF_VALUE: 10
PIF_VALUE: 10
PIF_VALUE: 12
PIF_VALUE: 2
PIF_VALUE: 10
PIF_VALUE: 12
PIF_VALUE: 12
PIF_VALUE: 2
PIF_VALUE: 2
PIF_VALUE: 12
PIF_VALUE: 12
PIF_VALUE: 10
PIF_VALUE: 12
PIF_VALUE: 12
PIF_VALUE: 2
PIF_VALUE: 12
PIF_VALUE: 12
PIF_VALUE: 10
PIF_VALUE: 12
PIF_VALUE: 2
PIF_VALUE: 2
PIF_VALUE: 1
PIF_VALUE: 10
PIF_VALUE: 0
PIF_VALUE: 12
PIF_VALUE: 2
PIF_VALUE: 12
PIF_VALUE: 10
PIF_VALUE: 12
PIF_VALUE: 10
PIF_VALUE: 0
PIF_VALUE: 10
PIF_VALUE: 1
PIF_VALUE: 12
PIF_VALUE: 10
PIF_VALUE: 15
PIF_VALUE: 10
PIF_VALUE: 10
PIF_VALUE: 2
PIF_VALUE: 10
PIF_VALUE: 0
PIF_VALUE: 0
PIF_VALUE: 1
PIF_VALUE: 10
PIF_VALUE: 14
PIF_VALUE: 10
PIF_VALUE: 2
PIF_VALUE: 0
PIF_VALUE: 10
PIF_VALUE: 12
PIF_VALUE: 10
PIF_VALUE: 12
PIF_VALUE: 12
PIF_VALUE: 14
PIF_VALUE: 1
PIF_VALUE: 12
PIF_VALUE: 2
PIF_VALUE: 10
PIF_VALUE: 12
PIF_VALUE: 2
PIF_VALUE: 12
PIF_VALUE: 12
PIF_VALUE: 10
PIF_VALUE: 12
PIF_VALUE: 10
PIF_VALUE: 12
PIF_VALUE: 10
PIF_VALUE: 12
PIF_VALUE: 10
PIF_VALUE: 12
PIF_VALUE: 12
PIF_VALUE: 10
PIF_VALUE: 11
PIF_VALUE: 2
PIF_VALUE: 10
PIF_VALUE: 13
PIF_VALUE: 12
PIF_VALUE: 10
PIF_VALUE: 1
PIF_VALUE: 10
PIF_VALUE: 2
PIF_VALUE: 10
PIF_VALUE: 12
PIF_VALUE: 12
PIF_VALUE: 10
PIF_VALUE: 2
PIF_VALUE: 12
PIF_VALUE: 12
PIF_VALUE: 10
PIF_VALUE: 0
PIF_VALUE: 2
PIF_VALUE: 1
PIF_VALUE: 12
PIF_VALUE: 10

## 2019-08-29 ASSESSMENT — PAIN SCALES - GENERAL
PAINLEVEL_OUTOF10: 6
PAINLEVEL_OUTOF10: 7
PAINLEVEL_OUTOF10: 6
PAINLEVEL_OUTOF10: 5

## 2019-08-29 NOTE — ANESTHESIA PRE PROCEDURE
Department of Anesthesiology  Preprocedure Note       Name:  Ana Torres   Age:  54 y.o.  :  1970                                          MRN:  [id number]         Date:  2019      Surgeon: Irene Ramirez):  Nam Monroe MD    Procedure: LEFT LUMPECTOMY AND  SENTINEL LYMPH NODE BIOPSY, NUC MED AT 7:30 AM, PAT AT [place] POINT, PECT BLOCK (Left )    Medications prior to admission:   Prior to Admission medications    Medication Sig Start Date End Date Taking? Authorizing Provider   Multiple Vitamins-Minerals (MULTIVITAMIN PO) Take by mouth   Yes Historical Provider, MD   Cholecalciferol (VITAMIN D3) 2000 units CAPS Take 4,000 Units by mouth   Yes Historical Provider, MD   hydrocortisone (ANUSOL-HC) 25 MG suppository UNWRAP AND INSERT 1 SUPPOSITORIES RECTALLY TWICE A DAY AS NEEDED 19   Historical Provider, MD   ketoconazole (NIZORAL) 2 % shampoo USE UTD BID 18   Historical Provider, MD       Current medications:    Current Facility-Administered Medications   Medication Dose Route Frequency Provider Last Rate Last Dose    clindamycin (CLEOCIN) 600 mg in dextrose 5 % 50 mL IVPB  600 mg Intravenous On Call to [address] [place] [address], MD        lactated ringers infusion   Intravenous Continuous Lisa Crowley  mL/hr at 19 0818 1,000 mL at 19 0818     Facility-Administered Medications Ordered in Other Encounters   Medication Dose Route Frequency Provider Last Rate Last Dose    technetium filtered sulfur colloid solution 2 millicurie  2 millicurie Subcutaneous ONCE PRN Nam Monroe MD           Allergies:     Allergies   Allergen Reactions    Avelox [Moxifloxacin]     Keflex [Cephalexin]     Pseudoephedrine        Problem List:    Patient Active Problem List   Diagnosis Code    Abnormal mammogram with microcalcification R92.0    Abnormal ultrasound of breast R92.8    Left breast mass N63.20    Abnormal mammogram of left breast R92.8    Carcinoma of lower-outer quadrant of left breast in 08/26/2019    CALCIUM 8.8 08/26/2019    BILITOT 0.8 08/26/2019    ALKPHOS 55 08/26/2019    AST 14 08/26/2019    ALT 7 08/26/2019       POC Tests: No results for input(s): POCGLU, POCNA, POCK, POCCL, POCBUN, POCHEMO, POCHCT in the last 72 hours. Coags:   Lab Results   Component Value Date    PROTIME 12.8 08/26/2019    INR 0.9 08/26/2019    APTT 30.5 08/26/2019       HCG (If Applicable):   Lab Results   Component Value Date    PREGTESTUR Negative 08/26/2019        ABGs: No results found for: PHART, PO2ART, KCH9CIZ, VSD9EOW, BEART, J8FSOYSG     Type & Screen (If Applicable):  No results found for: LABABO, LABRH    Anesthesia Evaluation    Airway: Mallampati: II  TM distance: >3 FB   Neck ROM: full  Mouth opening: > = 3 FB Dental: normal exam         Pulmonary: breath sounds clear to auscultation                            ROS comment: Smoke Vape   Cardiovascular:Negative CV ROS            Rhythm: regular                      Neuro/Psych:   Negative Neuro/Psych ROS     (-) seizures           GI/Hepatic/Renal: Neg GI/Hepatic/Renal ROS            Endo/Other:    (+) malignancy/cancer. Abdominal:           Vascular: negative vascular ROS. Anesthesia Plan      general and regional     ASA 2     (US guided PECS I and II block)  Induction: intravenous. MIPS: Prophylactic antiemetics administered. Anesthetic plan and risks discussed with patient. Plan discussed with CRNA.     Attending anesthesiologist reviewed and agrees with Pre Eval content              Lisa Crowley MD   8/29/2019

## 2019-08-29 NOTE — ANESTHESIA PROCEDURE NOTES
Peripheral Block    Patient location during procedure: pre-op  Start time: 8/29/2019 8:30 AM  End time: 8/29/2019 8:37 AM  Staffing  Anesthesiologist: Nima Zimmer MD  Performed: anesthesiologist   Preanesthetic Checklist  Completed: patient identified, site marked, surgical consent, pre-op evaluation, timeout performed, IV checked, risks and benefits discussed, monitors and equipment checked, anesthesia consent given, oxygen available and patient being monitored  Peripheral Block  Patient position: supine  Prep: ChloraPrep  Patient monitoring: cardiac monitor, continuous pulse ox, frequent blood pressure checks and IV access  Block type: PECS I and PECS II  Laterality: left  Injection technique: single-shot  Procedures: ultrasound guided and nerve stimulator  Local infiltration: ropivacaine  Infiltration strength: 0.5 %  Dose: 30 mL  Provider prep: mask and sterile gloves  Local infiltration: ropivacaine  Needle  Needle type: combined needle/nerve stimulator   Needle gauge: 21 G  Needle length: 10 cm  Needle localization: anatomical landmarks and ultrasound guidance  Assessment  Injection assessment: negative aspiration for heme, no paresthesia on injection and local visualized surrounding nerve on ultrasound  Paresthesia pain: immediately resolved  Slow fractionated injection: yes  Hemodynamics: stable  Additional Notes  Ultrasound image printed and saved in patient chart    Reason for block: post-op pain management and at surgeon's request

## 2019-08-30 ENCOUNTER — TELEPHONE (OUTPATIENT)
Dept: SURGERY | Age: 49
End: 2019-08-30

## 2019-09-03 ENCOUNTER — TELEPHONE (OUTPATIENT)
Dept: OBGYN CLINIC | Age: 49
End: 2019-09-03

## 2019-09-06 ENCOUNTER — TELEPHONE (OUTPATIENT)
Dept: SURGERY | Age: 49
End: 2019-09-06

## 2019-09-11 ENCOUNTER — TELEPHONE (OUTPATIENT)
Dept: OBGYN CLINIC | Age: 49
End: 2019-09-11

## 2019-09-11 ENCOUNTER — OFFICE VISIT (OUTPATIENT)
Dept: SURGERY | Age: 49
End: 2019-09-11
Payer: MEDICAID

## 2019-09-11 VITALS
RESPIRATION RATE: 18 BRPM | HEART RATE: 80 BPM | HEIGHT: 67 IN | SYSTOLIC BLOOD PRESSURE: 128 MMHG | WEIGHT: 150.8 LBS | BODY MASS INDEX: 23.67 KG/M2 | DIASTOLIC BLOOD PRESSURE: 64 MMHG

## 2019-09-11 DIAGNOSIS — Z17.0 CARCINOMA OF LOWER-OUTER QUADRANT OF LEFT BREAST IN FEMALE, ESTROGEN RECEPTOR POSITIVE (HCC): Primary | ICD-10-CM

## 2019-09-11 DIAGNOSIS — C50.512 CARCINOMA OF LOWER-OUTER QUADRANT OF LEFT BREAST IN FEMALE, ESTROGEN RECEPTOR POSITIVE (HCC): Primary | ICD-10-CM

## 2019-09-11 PROCEDURE — 99024 POSTOP FOLLOW-UP VISIT: CPT | Performed by: SURGERY

## 2019-09-13 ENCOUNTER — TELEPHONE (OUTPATIENT)
Dept: OBGYN CLINIC | Age: 49
End: 2019-09-13

## 2019-09-13 NOTE — TELEPHONE ENCOUNTER
Returned patient call. Patient was made aware per Dr. Landy Nettles that the results for genetic testing is not urgent and to keep scheduled appointment. Also, patient was advised to contact their department regarding the denial letter she received.

## 2019-09-18 ENCOUNTER — OFFICE VISIT (OUTPATIENT)
Dept: BEHAVIORAL/MENTAL HEALTH CLINIC | Age: 49
End: 2019-09-18
Payer: MEDICAID

## 2019-09-18 DIAGNOSIS — F41.9 ANXIETY: ICD-10-CM

## 2019-09-18 PROCEDURE — 1036F TOBACCO NON-USER: CPT | Performed by: PSYCHOLOGIST

## 2019-09-18 PROCEDURE — 90791 PSYCH DIAGNOSTIC EVALUATION: CPT | Performed by: PSYCHOLOGIST

## 2019-09-18 ASSESSMENT — PATIENT HEALTH QUESTIONNAIRE - PHQ9
4. FEELING TIRED OR HAVING LITTLE ENERGY: 0
SUM OF ALL RESPONSES TO PHQ9 QUESTIONS 1 & 2: 2
1. LITTLE INTEREST OR PLEASURE IN DOING THINGS: 1
5. POOR APPETITE OR OVEREATING: 3
6. FEELING BAD ABOUT YOURSELF - OR THAT YOU ARE A FAILURE OR HAVE LET YOURSELF OR YOUR FAMILY DOWN: 3
SUM OF ALL RESPONSES TO PHQ QUESTIONS 1-9: 8
8. MOVING OR SPEAKING SO SLOWLY THAT OTHER PEOPLE COULD HAVE NOTICED. OR THE OPPOSITE, BEING SO FIGETY OR RESTLESS THAT YOU HAVE BEEN MOVING AROUND A LOT MORE THAN USUAL: 0
SUM OF ALL RESPONSES TO PHQ QUESTIONS 1-9: 8
3. TROUBLE FALLING OR STAYING ASLEEP: 0
9. THOUGHTS THAT YOU WOULD BE BETTER OFF DEAD, OR OF HURTING YOURSELF: 0
2. FEELING DOWN, DEPRESSED OR HOPELESS: 1

## 2019-09-18 NOTE — PROGRESS NOTES
observed to experience several cognitive distortions during the appointment today. At this time she meets criteria for unspecified anxiety with a rule out of generalized anxiety disorder. Pt would likely benefit from [facility] services to increase coping skills and provide symptom control and relief. PHQ Scores 9/18/2019 8/26/2019   PHQ2 Score 2 1   PHQ9 Score 8 1     Interpretation of Total Score Depression Severity: 1-4 = Minimal depression, 5-9 = Mild depression, 10-14 = Moderate depression, 15-19 = Moderately severe depression, 20-27 = Severe depression    Diagnosis:    Unspecified anxiety  Rule out generalized anxiety disorder    Plan:  Pt interventions:  Established rapport, Conducted functional assessment, [place]-setting to identify pt's primary goals for [facility] visit / overall health, Supportive techniques, Emphasized self-care as important for managing overall health, Provided Psychoeducation re: anxiety, Cognitive strategies to target anxiety including helped pt examine the evidence of anxious/distorted thoughts, Identified relevant behavioral strategies for targeting anxiety including dedicate at least 5 minutes daily to self-care, Explained relaxed breathing technique in detail and practiced this with pt in visit and Emphasized importance of regular practice of relaxation strategies to target / promote symptom reduction. Pt Behavioral Change Plan:  1. Practice deep breathing 5-10 minutes per day (see directions below). 2. Pt would like to start on an antidepressant to help with her symptoms. Her PCP is through the [facility]. I recommended she follow up with her PCP to discuss medication management of her symptoms. 3. Recommended pt dedicate at least 5 minutes daily to self-care (handout given with examples/suggestions. 4. F/U in 2 weeks.     Please note this report has been partially produced using speech recognition software and may cause contain errors related to that system including grammar, punctuation and spelling as well as words and phrases that may seem inappropriate. If there are questions or concerns please feel free to contact me to clarify.

## 2019-09-20 ENCOUNTER — TELEPHONE (OUTPATIENT)
Dept: OBGYN CLINIC | Age: 49
End: 2019-09-20

## 2019-09-20 DIAGNOSIS — C50.512 CARCINOMA OF LOWER-OUTER QUADRANT OF LEFT BREAST IN FEMALE, ESTROGEN RECEPTOR POSITIVE (HCC): Primary | ICD-10-CM

## 2019-09-20 DIAGNOSIS — Z17.0 CARCINOMA OF LOWER-OUTER QUADRANT OF LEFT BREAST IN FEMALE, ESTROGEN RECEPTOR POSITIVE (HCC): Primary | ICD-10-CM

## 2019-09-23 ENCOUNTER — HOSPITAL ENCOUNTER (OUTPATIENT)
Dept: RADIATION ONCOLOGY | Age: 49
Discharge: HOME | End: 2019-09-23
Payer: MEDICAID

## 2019-09-23 VITALS
BODY MASS INDEX: 24.4 KG/M2 | TEMPERATURE: 98.4 F | SYSTOLIC BLOOD PRESSURE: 120 MMHG | WEIGHT: 155.5 LBS | DIASTOLIC BLOOD PRESSURE: 82 MMHG | HEIGHT: 67 IN | HEART RATE: 76 BPM | RESPIRATION RATE: 16 BRPM

## 2019-09-23 PROCEDURE — 99212 OFFICE O/P EST SF 10 MIN: CPT | Performed by: RADIOLOGY

## 2019-09-23 RX ORDER — IBUPROFEN 600 MG/1
600 TABLET ORAL EVERY 6 HOURS PRN
COMMUNITY
End: 2020-09-01 | Stop reason: ALTCHOICE

## 2019-09-23 RX ORDER — ZINC GLUCONATE 50 MG
TABLET ORAL PRN
COMMUNITY
End: 2019-11-18

## 2019-09-23 NOTE — H&P
HISTORY:   Past Medical History:   Diagnosis Date    Cancer [facility])     breast    Irritable bowel        PAST SURGICAL HISTORY:  Past Surgical History:   Procedure Laterality Date    BREAST BIOPSY Left 2019    BREAST BIOPSY Left 2019    LEFT LUMPECTOMY AND  SENTINEL LYMPH NODE BIOPSY performed by Matilde Ramos MD at [address] [place] [address] BREAST LUMPECTOMY      LAPAROSCOPY      LIVER BIOPSY      OVARIAN CYST REMOVAL      Bilat    TUBAL LIGATION          ALLERGIES:   Allergies   Allergen Reactions    Avelox [Moxifloxacin]     Keflex [Cephalexin]     Pseudoephedrine        CURRENT MEDICATIONS:   Prior to Admission medications    Medication Sig Start Date End Date Taking? Authorizing Provider   ibuprofen (ADVIL;MOTRIN) 600 MG tablet Take 600 mg by mouth every 6 hours as needed for Pain   Yes Historical Provider, MD   Zinc 50 MG TABS Take by mouth as needed   Yes Historical Provider, MD   Multiple Vitamins-Minerals (MULTIVITAMIN PO) Take by mouth   Yes Historical Provider, MD   Cholecalciferol (VITAMIN D3) 2000 units CAPS Take 4,000 Units by mouth   Yes Historical Provider, MD   hydrocortisone (ANUSOL-HC) 25 MG suppository UNWRAP AND INSERT 1 SUPPOSITORIES RECTALLY TWICE A DAY AS NEEDED 19   Historical Provider, MD   ketoconazole (NIZORAL) 2 % shampoo USE UTD BID 18   Historical Provider, MD       I have personally reviewed and reconciled the medications listed.     FAMILY HISTORY:   Family History   Problem Relation Age of Onset    Heart Attack Maternal Uncle     Diabetes Maternal Grandfather     Heart Attack Paternal Uncle        SOCIAL HISTORY:   Social History     Tobacco Use   Smoking Status Former Smoker    Packs/day: 0.50    Years: 30.00    Pack years: 15.00    Last attempt to quit: 2019    Years since quittin.4   Smokeless Tobacco Never Used     Social History     Substance and Sexual Activity   Alcohol Use Yes    Alcohol/week: 0.0 - 8.0 standard drinks    Comment: every other sounds, soft, no masses, no hepatosplenomegally    EXTREMITIES: No cyanosis clubbing or lymphedema    MUSCULOSKELETAL: No bony tenderness along the hips ribs or spine. Motor strength is 5 out of 5 all extremities bilaterally. Tone is normal.    NEUROLOGIC:  Awake, alert, oriented x 3. Nonfocal    SKIN:  no bruising or bleeding, normal skin color, texture, turgor, no redness, warmth, or swelling, no rashes, no lesions, no abnormal moles and no jaundice      STUDIES: I have personally reviewed the imaging, laboratory, and pathology studies as previously described. IMPRESSION/PLAN:    2.2 cm G2 IDC L breast w DCIS no LVI or EIC 0/3 SLN ER99 SD 99 Her2- Oncotype 15 <1% benefit CT neg margins. 42.56Gy to the right breast no boost recommended. She will see Dr Rosales Jones tomorrow. If no chemotherapy recommended she shayna undergo simulation o with ABC evaluation for cardiac sparing on 10/2. If chemo recommended, radiation to follow. Endocrine therapy to follow radiation. Intent of treatment, potential risks benefits and side effects reviewed today. Thank you for this consult and allowing us to participate in the care of this patient. Sincerely,    Electronically signed by Collette Bolder, MD on 9/23/19 at 2:15 PM      cc:   No att. providers found  No primary care provider on file.   Clearance MD Sage  [address] [place] [address]

## 2019-10-02 ENCOUNTER — HOSPITAL ENCOUNTER (OUTPATIENT)
Dept: RADIATION ONCOLOGY | Age: 49
Discharge: HOME | End: 2019-10-02
Payer: COMMERCIAL

## 2019-10-02 PROCEDURE — 99214 OFFICE O/P EST MOD 30 MIN: CPT | Performed by: RADIOLOGY

## 2019-10-02 PROCEDURE — 77290 THER RAD SIMULAJ FIELD CPLX: CPT | Performed by: RADIOLOGY

## 2019-10-02 PROCEDURE — 77334 RADIATION TREATMENT AID(S): CPT | Performed by: RADIOLOGY

## 2019-10-07 PROCEDURE — 77293 RESPIRATOR MOTION MGMT SIMUL: CPT | Performed by: RADIOLOGY

## 2019-10-07 PROCEDURE — 77300 RADIATION THERAPY DOSE PLAN: CPT | Performed by: RADIOLOGY

## 2019-10-07 PROCEDURE — 77295 3-D RADIOTHERAPY PLAN: CPT | Performed by: RADIOLOGY

## 2019-10-07 PROCEDURE — 77334 RADIATION TREATMENT AID(S): CPT | Performed by: RADIOLOGY

## 2019-10-09 PROCEDURE — 77280 THER RAD SIMULAJ FIELD SMPL: CPT | Performed by: RADIOLOGY

## 2019-10-10 PROCEDURE — 77412 RADIATION TX DELIVERY LVL 3: CPT | Performed by: RADIOLOGY

## 2019-10-11 PROCEDURE — 77412 RADIATION TX DELIVERY LVL 3: CPT | Performed by: RADIOLOGY

## 2019-10-14 PROCEDURE — 77412 RADIATION TX DELIVERY LVL 3: CPT | Performed by: RADIOLOGY

## 2019-10-14 PROCEDURE — 99211 OFF/OP EST MAY X REQ PHY/QHP: CPT | Performed by: RADIOLOGY

## 2019-10-15 PROCEDURE — 77412 RADIATION TX DELIVERY LVL 3: CPT | Performed by: RADIOLOGY

## 2019-10-16 PROCEDURE — 77417 THER RADIOLOGY PORT IMAGE(S): CPT | Performed by: RADIOLOGY

## 2019-10-16 PROCEDURE — 77336 RADIATION PHYSICS CONSULT: CPT | Performed by: RADIOLOGY

## 2019-10-16 PROCEDURE — 77412 RADIATION TX DELIVERY LVL 3: CPT | Performed by: RADIOLOGY

## 2019-10-17 PROCEDURE — 77412 RADIATION TX DELIVERY LVL 3: CPT | Performed by: RADIOLOGY

## 2019-10-18 PROCEDURE — 77412 RADIATION TX DELIVERY LVL 3: CPT | Performed by: RADIOLOGY

## 2019-10-21 PROCEDURE — 77412 RADIATION TX DELIVERY LVL 3: CPT | Performed by: RADIOLOGY

## 2019-10-22 PROCEDURE — 77412 RADIATION TX DELIVERY LVL 3: CPT | Performed by: RADIOLOGY

## 2019-10-23 PROCEDURE — 77336 RADIATION PHYSICS CONSULT: CPT | Performed by: RADIOLOGY

## 2019-10-23 PROCEDURE — 77412 RADIATION TX DELIVERY LVL 3: CPT | Performed by: RADIOLOGY

## 2019-10-23 PROCEDURE — 77417 THER RADIOLOGY PORT IMAGE(S): CPT | Performed by: RADIOLOGY

## 2019-10-24 PROCEDURE — 77412 RADIATION TX DELIVERY LVL 3: CPT | Performed by: RADIOLOGY

## 2019-10-25 PROCEDURE — 77412 RADIATION TX DELIVERY LVL 3: CPT | Performed by: RADIOLOGY

## 2019-10-28 PROCEDURE — 77412 RADIATION TX DELIVERY LVL 3: CPT | Performed by: RADIOLOGY

## 2019-10-29 ENCOUNTER — OFFICE VISIT (OUTPATIENT)
Dept: OBGYN CLINIC | Age: 49
End: 2019-10-29
Payer: COMMERCIAL

## 2019-10-29 VITALS
WEIGHT: 155 LBS | HEIGHT: 67 IN | SYSTOLIC BLOOD PRESSURE: 102 MMHG | BODY MASS INDEX: 24.33 KG/M2 | DIASTOLIC BLOOD PRESSURE: 68 MMHG

## 2019-10-29 DIAGNOSIS — B97.7 HIGH RISK HPV INFECTION: Primary | ICD-10-CM

## 2019-10-29 DIAGNOSIS — N95.1 PERIMENOPAUSE: ICD-10-CM

## 2019-10-29 DIAGNOSIS — C50.919 INVASIVE CARCINOMA OF BREAST (HCC): ICD-10-CM

## 2019-10-29 PROCEDURE — 77412 RADIATION TX DELIVERY LVL 3: CPT | Performed by: RADIOLOGY

## 2019-10-29 PROCEDURE — G8484 FLU IMMUNIZE NO ADMIN: HCPCS | Performed by: OBSTETRICS & GYNECOLOGY

## 2019-10-29 PROCEDURE — 99213 OFFICE O/P EST LOW 20 MIN: CPT | Performed by: RADIOLOGY

## 2019-10-29 PROCEDURE — 1036F TOBACCO NON-USER: CPT | Performed by: OBSTETRICS & GYNECOLOGY

## 2019-10-29 PROCEDURE — G8427 DOCREV CUR MEDS BY ELIG CLIN: HCPCS | Performed by: OBSTETRICS & GYNECOLOGY

## 2019-10-29 PROCEDURE — 99204 OFFICE O/P NEW MOD 45 MIN: CPT | Performed by: OBSTETRICS & GYNECOLOGY

## 2019-10-29 PROCEDURE — G8420 CALC BMI NORM PARAMETERS: HCPCS | Performed by: OBSTETRICS & GYNECOLOGY

## 2019-10-29 RX ORDER — LORAZEPAM 0.5 MG/1
0.5 TABLET ORAL EVERY 8 HOURS PRN
Qty: 20 TABLET | Refills: 1 | Status: SHIPPED | OUTPATIENT
Start: 2019-10-29 | End: 2019-11-18

## 2019-10-29 ASSESSMENT — ENCOUNTER SYMPTOMS
TROUBLE SWALLOWING: 0
NAUSEA: 0
CHEST TIGHTNESS: 0
COLOR CHANGE: 0
BLOOD IN STOOL: 0
WHEEZING: 0
SHORTNESS OF BREATH: 0
SORE THROAT: 0
ABDOMINAL DISTENTION: 0
VOMITING: 0
CONSTIPATION: 0
COUGH: 0
VOICE CHANGE: 0
ABDOMINAL PAIN: 0
BACK PAIN: 0

## 2019-10-30 ENCOUNTER — TELEPHONE (OUTPATIENT)
Dept: SURGERY | Age: 49
End: 2019-10-30

## 2019-10-30 PROCEDURE — 77336 RADIATION PHYSICS CONSULT: CPT | Performed by: RADIOLOGY

## 2019-10-30 PROCEDURE — 77412 RADIATION TX DELIVERY LVL 3: CPT | Performed by: RADIOLOGY

## 2019-10-31 PROCEDURE — 77412 RADIATION TX DELIVERY LVL 3: CPT | Performed by: RADIOLOGY

## 2019-11-04 ENCOUNTER — TELEPHONE (OUTPATIENT)
Dept: SURGERY | Age: 49
End: 2019-11-04

## 2019-11-07 ENCOUNTER — PROCEDURE VISIT (OUTPATIENT)
Dept: OBGYN CLINIC | Age: 49
End: 2019-11-07
Payer: COMMERCIAL

## 2019-11-07 VITALS — DIASTOLIC BLOOD PRESSURE: 76 MMHG | WEIGHT: 156 LBS | BODY MASS INDEX: 24.43 KG/M2 | SYSTOLIC BLOOD PRESSURE: 122 MMHG

## 2019-11-07 DIAGNOSIS — B97.7 HIGH RISK HPV INFECTION: ICD-10-CM

## 2019-11-07 DIAGNOSIS — B97.7 HIGH RISK HPV INFECTION: Primary | ICD-10-CM

## 2019-11-07 PROCEDURE — G8427 DOCREV CUR MEDS BY ELIG CLIN: HCPCS | Performed by: OBSTETRICS & GYNECOLOGY

## 2019-11-07 PROCEDURE — 57455 BIOPSY OF CERVIX W/SCOPE: CPT | Performed by: OBSTETRICS & GYNECOLOGY

## 2019-11-07 PROCEDURE — G8420 CALC BMI NORM PARAMETERS: HCPCS | Performed by: OBSTETRICS & GYNECOLOGY

## 2019-11-07 PROCEDURE — G8484 FLU IMMUNIZE NO ADMIN: HCPCS | Performed by: OBSTETRICS & GYNECOLOGY

## 2019-11-07 PROCEDURE — 1036F TOBACCO NON-USER: CPT | Performed by: OBSTETRICS & GYNECOLOGY

## 2019-11-18 ENCOUNTER — HOSPITAL ENCOUNTER (OUTPATIENT)
Dept: RADIATION ONCOLOGY | Age: 49
Discharge: HOME | End: 2019-11-18
Payer: COMMERCIAL

## 2019-11-18 VITALS
TEMPERATURE: 98 F | HEART RATE: 72 BPM | DIASTOLIC BLOOD PRESSURE: 69 MMHG | BODY MASS INDEX: 24.43 KG/M2 | RESPIRATION RATE: 16 BRPM | WEIGHT: 156 LBS | OXYGEN SATURATION: 97 % | SYSTOLIC BLOOD PRESSURE: 105 MMHG

## 2019-11-18 PROCEDURE — 99213 OFFICE O/P EST LOW 20 MIN: CPT | Performed by: RADIOLOGY

## 2019-11-21 ENCOUNTER — OFFICE VISIT (OUTPATIENT)
Dept: OBGYN CLINIC | Age: 49
End: 2019-11-21
Payer: COMMERCIAL

## 2019-11-21 VITALS
BODY MASS INDEX: 24.64 KG/M2 | WEIGHT: 157 LBS | SYSTOLIC BLOOD PRESSURE: 108 MMHG | DIASTOLIC BLOOD PRESSURE: 76 MMHG | HEART RATE: 76 BPM | HEIGHT: 67 IN

## 2019-11-21 DIAGNOSIS — C50.919 INVASIVE CARCINOMA OF BREAST (HCC): ICD-10-CM

## 2019-11-21 DIAGNOSIS — R87.619 ATYPICAL GLANDULAR CELLS OF UNDETERMINED SIGNIFICANCE (AGUS) ON CERVICAL PAP SMEAR: Primary | ICD-10-CM

## 2019-11-21 PROCEDURE — 1036F TOBACCO NON-USER: CPT | Performed by: OBSTETRICS & GYNECOLOGY

## 2019-11-21 PROCEDURE — G8427 DOCREV CUR MEDS BY ELIG CLIN: HCPCS | Performed by: OBSTETRICS & GYNECOLOGY

## 2019-11-21 PROCEDURE — G8420 CALC BMI NORM PARAMETERS: HCPCS | Performed by: OBSTETRICS & GYNECOLOGY

## 2019-11-21 PROCEDURE — G8484 FLU IMMUNIZE NO ADMIN: HCPCS | Performed by: OBSTETRICS & GYNECOLOGY

## 2019-11-21 PROCEDURE — 99214 OFFICE O/P EST MOD 30 MIN: CPT | Performed by: OBSTETRICS & GYNECOLOGY

## 2019-12-02 ENCOUNTER — OFFICE VISIT (OUTPATIENT)
Dept: FAMILY MEDICINE CLINIC | Age: 49
End: 2019-12-02
Payer: COMMERCIAL

## 2019-12-02 VITALS
TEMPERATURE: 98.3 F | DIASTOLIC BLOOD PRESSURE: 64 MMHG | OXYGEN SATURATION: 95 % | HEIGHT: 67 IN | SYSTOLIC BLOOD PRESSURE: 110 MMHG | WEIGHT: 157 LBS | HEART RATE: 72 BPM | RESPIRATION RATE: 14 BRPM | BODY MASS INDEX: 24.64 KG/M2

## 2019-12-02 DIAGNOSIS — C50.919 MALIGNANT NEOPLASM OF FEMALE BREAST, UNSPECIFIED ESTROGEN RECEPTOR STATUS, UNSPECIFIED LATERALITY, UNSPECIFIED SITE OF BREAST (HCC): ICD-10-CM

## 2019-12-02 DIAGNOSIS — Z01.818 PREOP EXAMINATION: ICD-10-CM

## 2019-12-02 DIAGNOSIS — Z01.818 PREOP EXAMINATION: Primary | ICD-10-CM

## 2019-12-02 DIAGNOSIS — R87.619 ATYPICAL GLANDULAR CELLS OF UNDETERMINED SIGNIFICANCE (AGUS) ON CERVICAL PAP SMEAR: ICD-10-CM

## 2019-12-02 LAB
ANION GAP SERPL CALCULATED.3IONS-SCNC: 13 MEQ/L (ref 9–15)
BUN BLDV-MCNC: 12 MG/DL (ref 6–20)
CALCIUM SERPL-MCNC: 9.2 MG/DL (ref 8.5–9.9)
CHLORIDE BLD-SCNC: 105 MEQ/L (ref 95–107)
CO2: 23 MEQ/L (ref 20–31)
CREAT SERPL-MCNC: 0.68 MG/DL (ref 0.5–0.9)
GFR AFRICAN AMERICAN: >60
GFR NON-AFRICAN AMERICAN: >60
GLUCOSE BLD-MCNC: 88 MG/DL (ref 70–99)
HCT VFR BLD CALC: 40.7 % (ref 37–47)
HEMOGLOBIN: 13.2 G/DL (ref 12–16)
MCH RBC QN AUTO: 31.3 PG (ref 27–31.3)
MCHC RBC AUTO-ENTMCNC: 32.5 % (ref 33–37)
MCV RBC AUTO: 96.5 FL (ref 82–100)
PDW BLD-RTO: 13.4 % (ref 11.5–14.5)
PLATELET # BLD: 399 K/UL (ref 130–400)
POTASSIUM SERPL-SCNC: 4 MEQ/L (ref 3.4–4.9)
RBC # BLD: 4.21 M/UL (ref 4.2–5.4)
SODIUM BLD-SCNC: 141 MEQ/L (ref 135–144)
WBC # BLD: 5.8 K/UL (ref 4.8–10.8)

## 2019-12-02 PROCEDURE — G8427 DOCREV CUR MEDS BY ELIG CLIN: HCPCS | Performed by: NURSE PRACTITIONER

## 2019-12-02 PROCEDURE — G8420 CALC BMI NORM PARAMETERS: HCPCS | Performed by: NURSE PRACTITIONER

## 2019-12-02 PROCEDURE — 99243 OFF/OP CNSLTJ NEW/EST LOW 30: CPT | Performed by: NURSE PRACTITIONER

## 2019-12-02 PROCEDURE — G8484 FLU IMMUNIZE NO ADMIN: HCPCS | Performed by: NURSE PRACTITIONER

## 2019-12-16 ENCOUNTER — ANESTHESIA (OUTPATIENT)
Dept: OPERATING ROOM | Age: 49
End: 2019-12-16
Payer: COMMERCIAL

## 2019-12-16 ENCOUNTER — HOSPITAL ENCOUNTER (OUTPATIENT)
Age: 49
Setting detail: OUTPATIENT SURGERY
Discharge: HOME | End: 2019-12-16
Attending: OBSTETRICS & GYNECOLOGY | Admitting: OBSTETRICS & GYNECOLOGY
Payer: COMMERCIAL

## 2019-12-16 ENCOUNTER — ANESTHESIA EVENT (OUTPATIENT)
Dept: OPERATING ROOM | Age: 49
End: 2019-12-16
Payer: COMMERCIAL

## 2019-12-16 VITALS
BODY MASS INDEX: 23.54 KG/M2 | SYSTOLIC BLOOD PRESSURE: 116 MMHG | HEART RATE: 68 BPM | DIASTOLIC BLOOD PRESSURE: 64 MMHG | HEIGHT: 67 IN | WEIGHT: 150 LBS | TEMPERATURE: 97.3 F | OXYGEN SATURATION: 98 % | RESPIRATION RATE: 16 BRPM

## 2019-12-16 VITALS — OXYGEN SATURATION: 100 % | TEMPERATURE: 95.9 F | DIASTOLIC BLOOD PRESSURE: 74 MMHG | SYSTOLIC BLOOD PRESSURE: 125 MMHG

## 2019-12-16 LAB
HCG, URINE, POC: NEGATIVE
Lab: NORMAL
NEGATIVE QC PASS/FAIL: NORMAL
POSITIVE QC PASS/FAIL: NORMAL

## 2019-12-16 PROCEDURE — 3600000014 HC SURGERY LEVEL 4 ADDTL 15MIN: Performed by: OBSTETRICS & GYNECOLOGY

## 2019-12-16 PROCEDURE — 2720000010 HC SURG SUPPLY STERILE: Performed by: OBSTETRICS & GYNECOLOGY

## 2019-12-16 PROCEDURE — 2580000003 HC RX 258: Performed by: NURSE PRACTITIONER

## 2019-12-16 PROCEDURE — 88331 PATH CONSLTJ SURG 1 BLK 1SPC: CPT

## 2019-12-16 PROCEDURE — 7100000011 HC PHASE II RECOVERY - ADDTL 15 MIN: Performed by: OBSTETRICS & GYNECOLOGY

## 2019-12-16 PROCEDURE — 58571 TLH W/T/O 250 G OR LESS: CPT | Performed by: OBSTETRICS & GYNECOLOGY

## 2019-12-16 PROCEDURE — 88329 PATH CONSLTJ DRG SURG: CPT

## 2019-12-16 PROCEDURE — 88342 IMHCHEM/IMCYTCHM 1ST ANTB: CPT

## 2019-12-16 PROCEDURE — 3700000000 HC ANESTHESIA ATTENDED CARE: Performed by: OBSTETRICS & GYNECOLOGY

## 2019-12-16 PROCEDURE — 88332 PATH CONSLTJ SURG EA ADD BLK: CPT

## 2019-12-16 PROCEDURE — 3700000001 HC ADD 15 MINUTES (ANESTHESIA): Performed by: OBSTETRICS & GYNECOLOGY

## 2019-12-16 PROCEDURE — 76942 ECHO GUIDE FOR BIOPSY: CPT | Performed by: ANESTHESIOLOGY

## 2019-12-16 PROCEDURE — 3600000004 HC SURGERY LEVEL 4 BASE: Performed by: OBSTETRICS & GYNECOLOGY

## 2019-12-16 PROCEDURE — 6360000002 HC RX W HCPCS: Performed by: OBSTETRICS & GYNECOLOGY

## 2019-12-16 PROCEDURE — 2709999900 HC NON-CHARGEABLE SUPPLY: Performed by: OBSTETRICS & GYNECOLOGY

## 2019-12-16 PROCEDURE — 2780000010 HC IMPLANT OTHER: Performed by: OBSTETRICS & GYNECOLOGY

## 2019-12-16 PROCEDURE — 7100000001 HC PACU RECOVERY - ADDTL 15 MIN: Performed by: OBSTETRICS & GYNECOLOGY

## 2019-12-16 PROCEDURE — 2500000003 HC RX 250 WO HCPCS: Performed by: ANESTHESIOLOGY

## 2019-12-16 PROCEDURE — 88305 TISSUE EXAM BY PATHOLOGIST: CPT

## 2019-12-16 PROCEDURE — 2500000003 HC RX 250 WO HCPCS: Performed by: NURSE ANESTHETIST, CERTIFIED REGISTERED

## 2019-12-16 PROCEDURE — 88307 TISSUE EXAM BY PATHOLOGIST: CPT

## 2019-12-16 PROCEDURE — 2500000003 HC RX 250 WO HCPCS: Performed by: OBSTETRICS & GYNECOLOGY

## 2019-12-16 PROCEDURE — 6360000002 HC RX W HCPCS: Performed by: NURSE ANESTHETIST, CERTIFIED REGISTERED

## 2019-12-16 PROCEDURE — 6360000002 HC RX W HCPCS: Performed by: ANESTHESIOLOGY

## 2019-12-16 PROCEDURE — 6370000000 HC RX 637 (ALT 250 FOR IP): Performed by: OBSTETRICS & GYNECOLOGY

## 2019-12-16 PROCEDURE — 88341 IMHCHEM/IMCYTCHM EA ADD ANTB: CPT

## 2019-12-16 PROCEDURE — 7100000010 HC PHASE II RECOVERY - FIRST 15 MIN: Performed by: OBSTETRICS & GYNECOLOGY

## 2019-12-16 PROCEDURE — 2580000003 HC RX 258: Performed by: OBSTETRICS & GYNECOLOGY

## 2019-12-16 PROCEDURE — 7100000000 HC PACU RECOVERY - FIRST 15 MIN: Performed by: OBSTETRICS & GYNECOLOGY

## 2019-12-16 RX ORDER — ONDANSETRON 2 MG/ML
INJECTION INTRAMUSCULAR; INTRAVENOUS PRN
Status: DISCONTINUED | OUTPATIENT
Start: 2019-12-16 | End: 2019-12-16 | Stop reason: SDUPTHER

## 2019-12-16 RX ORDER — SODIUM CHLORIDE 0.9 % (FLUSH) 0.9 %
10 SYRINGE (ML) INJECTION EVERY 12 HOURS SCHEDULED
Status: DISCONTINUED | OUTPATIENT
Start: 2019-12-16 | End: 2019-12-16 | Stop reason: HOSPADM

## 2019-12-16 RX ORDER — SODIUM CHLORIDE, SODIUM LACTATE, POTASSIUM CHLORIDE, CALCIUM CHLORIDE 600; 310; 30; 20 MG/100ML; MG/100ML; MG/100ML; MG/100ML
INJECTION, SOLUTION INTRAVENOUS CONTINUOUS PRN
Status: COMPLETED | OUTPATIENT
Start: 2019-12-16 | End: 2019-12-16

## 2019-12-16 RX ORDER — DIPHENHYDRAMINE HYDROCHLORIDE 50 MG/ML
12.5 INJECTION INTRAMUSCULAR; INTRAVENOUS
Status: DISCONTINUED | OUTPATIENT
Start: 2019-12-16 | End: 2019-12-16 | Stop reason: HOSPADM

## 2019-12-16 RX ORDER — ROPIVACAINE HYDROCHLORIDE 5 MG/ML
INJECTION, SOLUTION EPIDURAL; INFILTRATION; PERINEURAL PRN
Status: DISCONTINUED | OUTPATIENT
Start: 2019-12-16 | End: 2019-12-16 | Stop reason: SDUPTHER

## 2019-12-16 RX ORDER — SODIUM CHLORIDE 0.9 % (FLUSH) 0.9 %
10 SYRINGE (ML) INJECTION PRN
Status: DISCONTINUED | OUTPATIENT
Start: 2019-12-16 | End: 2019-12-16 | Stop reason: HOSPADM

## 2019-12-16 RX ORDER — MAGNESIUM HYDROXIDE 1200 MG/15ML
LIQUID ORAL CONTINUOUS PRN
Status: COMPLETED | OUTPATIENT
Start: 2019-12-16 | End: 2019-12-16

## 2019-12-16 RX ORDER — PROPOFOL 10 MG/ML
INJECTION, EMULSION INTRAVENOUS PRN
Status: DISCONTINUED | OUTPATIENT
Start: 2019-12-16 | End: 2019-12-16 | Stop reason: SDUPTHER

## 2019-12-16 RX ORDER — METOCLOPRAMIDE HYDROCHLORIDE 5 MG/ML
10 INJECTION INTRAMUSCULAR; INTRAVENOUS
Status: DISCONTINUED | OUTPATIENT
Start: 2019-12-16 | End: 2019-12-16 | Stop reason: HOSPADM

## 2019-12-16 RX ORDER — SODIUM CHLORIDE, SODIUM LACTATE, POTASSIUM CHLORIDE, CALCIUM CHLORIDE 600; 310; 30; 20 MG/100ML; MG/100ML; MG/100ML; MG/100ML
INJECTION, SOLUTION INTRAVENOUS CONTINUOUS
Status: DISCONTINUED | OUTPATIENT
Start: 2019-12-16 | End: 2019-12-16 | Stop reason: HOSPADM

## 2019-12-16 RX ORDER — MIDAZOLAM HYDROCHLORIDE 1 MG/ML
INJECTION INTRAMUSCULAR; INTRAVENOUS PRN
Status: DISCONTINUED | OUTPATIENT
Start: 2019-12-16 | End: 2019-12-16 | Stop reason: SDUPTHER

## 2019-12-16 RX ORDER — LIDOCAINE HYDROCHLORIDE AND EPINEPHRINE 20; 5 MG/ML; UG/ML
INJECTION, SOLUTION EPIDURAL; INFILTRATION; INTRACAUDAL; PERINEURAL PRN
Status: DISCONTINUED | OUTPATIENT
Start: 2019-12-16 | End: 2019-12-16 | Stop reason: SDUPTHER

## 2019-12-16 RX ORDER — IBUPROFEN 400 MG/1
400 TABLET ORAL EVERY 6 HOURS PRN
Status: DISCONTINUED | OUTPATIENT
Start: 2019-12-16 | End: 2019-12-16 | Stop reason: HOSPADM

## 2019-12-16 RX ORDER — LIDOCAINE HYDROCHLORIDE AND EPINEPHRINE 10; 10 MG/ML; UG/ML
INJECTION, SOLUTION INFILTRATION; PERINEURAL PRN
Status: DISCONTINUED | OUTPATIENT
Start: 2019-12-16 | End: 2019-12-16 | Stop reason: ALTCHOICE

## 2019-12-16 RX ORDER — FENTANYL CITRATE 50 UG/ML
25 INJECTION, SOLUTION INTRAMUSCULAR; INTRAVENOUS EVERY 10 MIN PRN
Status: DISCONTINUED | OUTPATIENT
Start: 2019-12-16 | End: 2019-12-16 | Stop reason: HOSPADM

## 2019-12-16 RX ORDER — ONDANSETRON 2 MG/ML
4 INJECTION INTRAMUSCULAR; INTRAVENOUS EVERY 8 HOURS PRN
Status: DISCONTINUED | OUTPATIENT
Start: 2019-12-16 | End: 2019-12-16 | Stop reason: HOSPADM

## 2019-12-16 RX ORDER — SCOLOPAMINE TRANSDERMAL SYSTEM 1 MG/1
1 PATCH, EXTENDED RELEASE TRANSDERMAL
Status: DISCONTINUED | OUTPATIENT
Start: 2019-12-16 | End: 2019-12-16 | Stop reason: HOSPADM

## 2019-12-16 RX ORDER — OXYCODONE HYDROCHLORIDE AND ACETAMINOPHEN 5; 325 MG/1; MG/1
1 TABLET ORAL EVERY 6 HOURS PRN
Qty: 20 TABLET | Refills: 0 | Status: SHIPPED | OUTPATIENT
Start: 2019-12-16 | End: 2019-12-21

## 2019-12-16 RX ORDER — CEFAZOLIN SODIUM 2 G/50ML
2 SOLUTION INTRAVENOUS
Status: COMPLETED | OUTPATIENT
Start: 2019-12-16 | End: 2019-12-16

## 2019-12-16 RX ORDER — DEXAMETHASONE SODIUM PHOSPHATE 4 MG/ML
INJECTION, SOLUTION INTRA-ARTICULAR; INTRALESIONAL; INTRAMUSCULAR; INTRAVENOUS; SOFT TISSUE PRN
Status: DISCONTINUED | OUTPATIENT
Start: 2019-12-16 | End: 2019-12-16 | Stop reason: SDUPTHER

## 2019-12-16 RX ORDER — ROCURONIUM BROMIDE 10 MG/ML
INJECTION, SOLUTION INTRAVENOUS PRN
Status: DISCONTINUED | OUTPATIENT
Start: 2019-12-16 | End: 2019-12-16 | Stop reason: SDUPTHER

## 2019-12-16 RX ORDER — MEPERIDINE HYDROCHLORIDE 25 MG/ML
12.5 INJECTION INTRAMUSCULAR; INTRAVENOUS; SUBCUTANEOUS EVERY 5 MIN PRN
Status: DISCONTINUED | OUTPATIENT
Start: 2019-12-16 | End: 2019-12-16 | Stop reason: HOSPADM

## 2019-12-16 RX ORDER — LIDOCAINE HYDROCHLORIDE 20 MG/ML
JELLY TOPICAL PRN
Status: DISCONTINUED | OUTPATIENT
Start: 2019-12-16 | End: 2019-12-16 | Stop reason: ALTCHOICE

## 2019-12-16 RX ORDER — HYDROCODONE BITARTRATE AND ACETAMINOPHEN 5; 325 MG/1; MG/1
2 TABLET ORAL PRN
Status: DISCONTINUED | OUTPATIENT
Start: 2019-12-16 | End: 2019-12-16 | Stop reason: HOSPADM

## 2019-12-16 RX ORDER — HYDROCODONE BITARTRATE AND ACETAMINOPHEN 5; 325 MG/1; MG/1
1 TABLET ORAL PRN
Status: DISCONTINUED | OUTPATIENT
Start: 2019-12-16 | End: 2019-12-16 | Stop reason: HOSPADM

## 2019-12-16 RX ORDER — ONDANSETRON 2 MG/ML
4 INJECTION INTRAMUSCULAR; INTRAVENOUS
Status: DISCONTINUED | OUTPATIENT
Start: 2019-12-16 | End: 2019-12-16 | Stop reason: HOSPADM

## 2019-12-16 RX ORDER — LIDOCAINE HYDROCHLORIDE 20 MG/ML
INJECTION, SOLUTION INTRAVENOUS PRN
Status: DISCONTINUED | OUTPATIENT
Start: 2019-12-16 | End: 2019-12-16 | Stop reason: SDUPTHER

## 2019-12-16 RX ORDER — KETOROLAC TROMETHAMINE 30 MG/ML
INJECTION, SOLUTION INTRAMUSCULAR; INTRAVENOUS PRN
Status: DISCONTINUED | OUTPATIENT
Start: 2019-12-16 | End: 2019-12-16 | Stop reason: SDUPTHER

## 2019-12-16 RX ORDER — KETOROLAC TROMETHAMINE 30 MG/ML
30 INJECTION, SOLUTION INTRAMUSCULAR; INTRAVENOUS EVERY 6 HOURS
Status: DISCONTINUED | OUTPATIENT
Start: 2019-12-16 | End: 2019-12-16 | Stop reason: HOSPADM

## 2019-12-16 RX ORDER — LIDOCAINE HYDROCHLORIDE 10 MG/ML
1 INJECTION, SOLUTION EPIDURAL; INFILTRATION; INTRACAUDAL; PERINEURAL
Status: DISCONTINUED | OUTPATIENT
Start: 2019-12-16 | End: 2019-12-16 | Stop reason: HOSPADM

## 2019-12-16 RX ADMIN — CEFAZOLIN SODIUM 2 G: 2 SOLUTION INTRAVENOUS at 07:30

## 2019-12-16 RX ADMIN — MIDAZOLAM HYDROCHLORIDE 2 MG: 2 INJECTION, SOLUTION INTRAMUSCULAR; INTRAVENOUS at 07:28

## 2019-12-16 RX ADMIN — LIDOCAINE HYDROCHLORIDE 50 MG: 20 INJECTION, SOLUTION INTRAVENOUS at 07:38

## 2019-12-16 RX ADMIN — MIDAZOLAM HYDROCHLORIDE 2 MG: 2 INJECTION, SOLUTION INTRAMUSCULAR; INTRAVENOUS at 07:20

## 2019-12-16 RX ADMIN — LIDOCAINE HYDROCHLORIDE,EPINEPHRINE BITARTRATE 10 ML: 20; .005 INJECTION, SOLUTION EPIDURAL; INFILTRATION; INTRACAUDAL; PERINEURAL at 07:26

## 2019-12-16 RX ADMIN — SODIUM CHLORIDE, POTASSIUM CHLORIDE, SODIUM LACTATE AND CALCIUM CHLORIDE: 600; 310; 30; 20 INJECTION, SOLUTION INTRAVENOUS at 08:14

## 2019-12-16 RX ADMIN — ROCURONIUM BROMIDE 50 MG: 10 INJECTION INTRAVENOUS at 07:38

## 2019-12-16 RX ADMIN — SUGAMMADEX 200 MG: 100 INJECTION, SOLUTION INTRAVENOUS at 09:03

## 2019-12-16 RX ADMIN — ROPIVACAINE HYDROCHLORIDE 20 ML: 5 INJECTION, SOLUTION EPIDURAL; INFILTRATION; PERINEURAL at 07:26

## 2019-12-16 RX ADMIN — FENTANYL CITRATE 100 MCG: 50 INJECTION, SOLUTION INTRAMUSCULAR; INTRAVENOUS at 07:38

## 2019-12-16 RX ADMIN — SODIUM CHLORIDE, POTASSIUM CHLORIDE, SODIUM LACTATE AND CALCIUM CHLORIDE: 600; 310; 30; 20 INJECTION, SOLUTION INTRAVENOUS at 06:48

## 2019-12-16 RX ADMIN — KETOROLAC TROMETHAMINE 30 MG: 30 INJECTION, SOLUTION INTRAMUSCULAR at 09:02

## 2019-12-16 RX ADMIN — METRONIDAZOLE 500 MG: 500 INJECTION, SOLUTION INTRAVENOUS at 07:44

## 2019-12-16 RX ADMIN — INDIGO CARMINE 40 MG: 8 INJECTION, SOLUTION INTRAMUSCULAR; INTRAVENOUS at 08:53

## 2019-12-16 RX ADMIN — SODIUM CHLORIDE, POTASSIUM CHLORIDE, SODIUM LACTATE AND CALCIUM CHLORIDE: 600; 310; 30; 20 INJECTION, SOLUTION INTRAVENOUS at 09:05

## 2019-12-16 RX ADMIN — PROPOFOL 200 MG: 10 INJECTION, EMULSION INTRAVENOUS at 07:38

## 2019-12-16 RX ADMIN — DEXAMETHASONE SODIUM PHOSPHATE 4 MG: 4 INJECTION, SOLUTION INTRA-ARTICULAR; INTRALESIONAL; INTRAMUSCULAR; INTRAVENOUS; SOFT TISSUE at 07:38

## 2019-12-16 RX ADMIN — ONDANSETRON 4 MG: 2 INJECTION INTRAMUSCULAR; INTRAVENOUS at 08:56

## 2019-12-16 ASSESSMENT — PULMONARY FUNCTION TESTS
PIF_VALUE: 24
PIF_VALUE: 18
PIF_VALUE: 2
PIF_VALUE: 5
PIF_VALUE: 13
PIF_VALUE: 13
PIF_VALUE: 24
PIF_VALUE: 13
PIF_VALUE: 15
PIF_VALUE: 1
PIF_VALUE: 12
PIF_VALUE: 23
PIF_VALUE: 23
PIF_VALUE: 18
PIF_VALUE: 18
PIF_VALUE: 24
PIF_VALUE: 24
PIF_VALUE: 13
PIF_VALUE: 13
PIF_VALUE: 24
PIF_VALUE: 24
PIF_VALUE: 12
PIF_VALUE: 13
PIF_VALUE: 12
PIF_VALUE: 16
PIF_VALUE: 13
PIF_VALUE: 18
PIF_VALUE: 18
PIF_VALUE: 0
PIF_VALUE: 16
PIF_VALUE: 24
PIF_VALUE: 16
PIF_VALUE: 14
PIF_VALUE: 13
PIF_VALUE: 23
PIF_VALUE: 12
PIF_VALUE: 1
PIF_VALUE: 23
PIF_VALUE: 14
PIF_VALUE: 24
PIF_VALUE: 6
PIF_VALUE: 13
PIF_VALUE: 16
PIF_VALUE: 12
PIF_VALUE: 13
PIF_VALUE: 12
PIF_VALUE: 17
PIF_VALUE: 23
PIF_VALUE: 13
PIF_VALUE: 24
PIF_VALUE: 14
PIF_VALUE: 12
PIF_VALUE: 25
PIF_VALUE: 19
PIF_VALUE: 24
PIF_VALUE: 24
PIF_VALUE: 13
PIF_VALUE: 15
PIF_VALUE: 24
PIF_VALUE: 24
PIF_VALUE: 16
PIF_VALUE: 13
PIF_VALUE: 24
PIF_VALUE: 16
PIF_VALUE: 16
PIF_VALUE: 15
PIF_VALUE: 1
PIF_VALUE: 13
PIF_VALUE: 17
PIF_VALUE: 16
PIF_VALUE: 13
PIF_VALUE: 18
PIF_VALUE: 17
PIF_VALUE: 24
PIF_VALUE: 0
PIF_VALUE: 13
PIF_VALUE: 18
PIF_VALUE: 12
PIF_VALUE: 24
PIF_VALUE: 21
PIF_VALUE: 25
PIF_VALUE: 19
PIF_VALUE: 2
PIF_VALUE: 24
PIF_VALUE: 23
PIF_VALUE: 22
PIF_VALUE: 23
PIF_VALUE: 15
PIF_VALUE: 0
PIF_VALUE: 15
PIF_VALUE: 17
PIF_VALUE: 13
PIF_VALUE: 19
PIF_VALUE: 14
PIF_VALUE: 13
PIF_VALUE: 24
PIF_VALUE: 15
PIF_VALUE: 24
PIF_VALUE: 1
PIF_VALUE: 12
PIF_VALUE: 21
PIF_VALUE: 0
PIF_VALUE: 24

## 2019-12-16 ASSESSMENT — PAIN DESCRIPTION - ORIENTATION: ORIENTATION: MID

## 2019-12-16 ASSESSMENT — PAIN SCALES - GENERAL
PAINLEVEL_OUTOF10: 2
PAINLEVEL_OUTOF10: 6

## 2019-12-16 ASSESSMENT — PAIN DESCRIPTION - LOCATION: LOCATION: ABDOMEN

## 2019-12-16 ASSESSMENT — PAIN DESCRIPTION - DESCRIPTORS: DESCRIPTORS: SORE

## 2019-12-16 ASSESSMENT — PAIN DESCRIPTION - PAIN TYPE: TYPE: SURGICAL PAIN

## 2019-12-16 ASSESSMENT — PAIN - FUNCTIONAL ASSESSMENT: PAIN_FUNCTIONAL_ASSESSMENT: 0-10

## 2019-12-16 NOTE — OP NOTE
[address]                      [address], [address] [facility]                                OPERATIVE REPORT    PATIENT NAME: Jacque Lopez                     :        1970  MED REC NO:   [id number]                            ROOM:  ACCOUNT NO:   [de-identified]                           ADMIT DATE: 2019  PROVIDER:     Luis Miller DO    DATE OF PROCEDURE:  2019    PREOPERATIVE DIAGNOSES:  Atypical glandular cells of undetermined  significance on an endocervical curettage and a history of invasive  ductal carcinoma of the breast.    POSTOPERATIVE DIAGNOSES:  Atypical glandular cells of undetermined  significance on an endocervical curettage, history of invasive ductal  carcinoma of the breast, multiple uterine fibroids and a proliferative  endometrium on frozen section. PROCEDURE:  LAVH-BSO, cystoscopy. SURGEON:  Keo Akins DO    ANESTHESIA:  General.    COMPLICATIONS:  None. ESTIMATED BLOOD LOSS:  25 mL. INDICATIONS:  This is a 71-year-old multiparous female with a previous  sterilization procedure, who has invasive ductal carcinoma of the  breast.  She is requesting hysterectomy and removal of the ovaries to  reduce estrogen burden. She did have an abnormal Pap. Colposcopy was  suggestive of low-grade squamous intraepithelial lesion and atypical  glandular cells, so the plan was to proceed with a fractional D and C  with a frozen section prior to the hysterectomy. The potential  complications of the procedure, bleeding, infection, medication  reaction, bowel, bladder, ureter injury or vascular complications were  explained to her in detail. Appropriate consent was obtained and was  placed on the chart. DESCRIPTION OF PROCEDURE:  The patient was taken to the operating room  where a general anesthetic was administered. She was then placed in the  dorsal lithotomy position, prepped and draped in the usual fashion.    Bladder was drained. Time-out was called. The endocervical canal was  progressively dilated. Uterus sounded to 9 cm. The fractional D and C  was performed and sent for frozen section, which showed a proliferative  endometrium. The cervix was injected with lidocaine-epinephrine mix. The uterine manipulator and colpotomy cup were then placed. A small  infraumbilical incision was made. The lower abdomen was elevated and  with a 5-mm laparoscope, through the end of a 5-mm blunt trocar, direct  visualization entry was accomplished while aiming toward the hollow of  the sacrum directly in the midline. Entry was uneventful. Pneumoperitoneum was established. A second access port was placed along  the midclavicular line in the periumbilical region. Using a 5-mm blunt  trocar, no vascular injury was encountered. The upper abdomen was  visualized and was normal.  The appendix was visualized and was normal  as well. The uterus was enlarged with multiple uterine fibroids. The  ovaries were normal.  The fallopian tubes have been . Vesicouterine reflection was normal.  I could see the right ureter  peristalsing, but not the left. The harmonic scalpel was then used to  transect the infundibulopelvic ligaments bilaterally, the broad  ligament, the round ligaments and then the parametrial tissue to the  uterine vessels. The vesicouterine reflection was incised and   from the lower uterine segment and then the anterior and posterior  colpotomy incisions were made. The remaining portion of the procedure  was done vaginally. The cervicovaginal mucosa was coagulated and cut  and then the uterosacral ligaments and the cardinal ligaments were  coagulated and cut using the LigaSure instrument. The uterus, cervix,  fallopian tubes and ovaries were then removed. The vagina was then  closed.   Corner stitches were applied incorporating the anterior and  posterior peritoneum, the vaginal mucosa, the uterosacral ligaments and  the cardinal ligaments bilaterally. Then the intervening vaginal cuff  was closed using a running locking closure. The vagina was irrigated. Hemostasis was present. Pneumoperitoneum was then reestablished. The  vascular pedicles and vaginal cuff were hemostatic. The pressure was  let down to less than 5 mmHg and there was no bleeding noted. The right  ureter could be seen visualizing, I could not see the left. After the  pelvis was copiously irrigated and the fluid was aspirated, the Ana  was sprayed across the vaginal cuff and the vascular pedicles. The  instruments removed. The excess carbon dioxide was allowed to escape. The incisions were closed with a subcuticular stitch after being  cleansed and appropriately dressed. Cystoscopic evaluation was then  performed. The bladder is grossly normal and both the ureteral orifices  are open with a brisk efflux of indigo carmine-stained urine coming from  each side. The bladder was then drained. Sponge, needle and instrument  counts were correct. The patient was then allowed to emerge from  anesthesia, was taken to the PACU in good condition. She tolerated the  surgery and the anesthesia well.         Niya Zacarias DO    D: 12/16/2019 9:40:41       T: 12/16/2019 9:43:59     [email or web address]  Job#: [id number]     Doc#: [id number]    CC:

## 2019-12-16 NOTE — H&P
Department of Obstetrics and Gynecology   History & Physical    Pt Name: Jorden Srinivasan  MRN: [id number] Viv #: [de-identified]  YOB: 1970  Estimated Date of Delivery: None noted. HPI: The patient is a 54 y.o.  female  who presents for [facility]    Allergies:     Allergies as of 2019 - Review Complete 2019   Allergen Reaction Noted    Avelox [moxifloxacin]  2016    Keflex [cephalexin]  2019    Pseudoephedrine  2019       Medications:    Current Facility-Administered Medications:     lactated ringers infusion, , Intravenous, Continuous, Neeru Kaylie, APRN - CNP, Last Rate: 125 mL/hr at 19 8778    sodium chloride flush 0.9 % injection 10 mL, 10 mL, Intravenous, 2 times per day, Neeru Kaylie, APRN - CNP    sodium chloride flush 0.9 % injection 10 mL, 10 mL, Intravenous, PRN, Neeru Kaylie, APRN - CNP    lidocaine PF 1 % injection 1 mL, 1 mL, Intradermal, Once PRN, Neeru Kaylie, APRN - CNP    scopolamine (TRANSDERM-SCOP) transdermal patch 1 patch, 1 patch, Transdermal, On Call to OR, Jonh Bal DO, 1 patch at 19 9980    metronidazole (FLAGYL) 500 mg in NaCl 100 mL IVPB premix, 500 mg, Intravenous, Q8H, Jonh Bal DO    ceFAZolin (ANCEF) 2 g in dextrose 3 % 50 mL IVPB (duplex), 2 g, Intravenous, On Call to OR, Jonh Bal DO    fentaNYL (SUBLIMAZE) injection 25 mcg, 25 mcg, Intravenous, Q10 Min PRN, Omaira Chaney MD    HYDROcodone-acetaminophen (NORCO) 5-325 MG per tablet 1 tablet, 1 tablet, Oral, PRN **OR** HYDROcodone-acetaminophen (NORCO) 5-325 MG per tablet 2 tablet, 2 tablet, Oral, PRN, Omaira Chaney MD    diphenhydrAMINE (BENADRYL) injection 12.5 mg, 12.5 mg, Intravenous, Once PRN, Omaira Chaney MD    ondansetron (ZOFRAN) injection 4 mg, 4 mg, Intravenous, Once PRN, Omaira Chaney MD    metoclopramide (REGLAN) injection 10 mg, 10 mg, Intravenous, Once PRN, MD Anish Barbour meperidine (DEMEROL) injection 12.5 mg, 12.5 mg, Intravenous, Q5 Min PRN, Faisal Hendricks MD    OB History: [id number]    Gyn History: Denies h/o abnormal pap smear, h/o STDs. Past Medical History:   Past Medical History:   Diagnosis Date    Abnormal Pap smear of cervix     Cancer ([facility] [address]     breast    Irritable bowel        Past Surgical History:   Past Surgical History:   Procedure Laterality Date    BREAST BIOPSY Left 2019    BREAST BIOPSY Left 2019    LEFT LUMPECTOMY AND  SENTINEL LYMPH NODE BIOPSY performed by Joe Leslie MD at [address] [place] [address] BREAST LUMPECTOMY      LAPAROSCOPY      LIVER BIOPSY      OVARIAN CYST REMOVAL      Bilat    TUBAL LIGATION         Social History:   Social History     Tobacco Use   Smoking Status Former Smoker    Packs/day: 0.50    Years: 30.00    Pack years: 15.00    Last attempt to quit: 2019    Years since quittin.6   Smokeless Tobacco Never Used        Family History: Noncontributory; Denies h/o cancer. ROS:  Negative except as stated in HPI, denies nausea, vomiting, fever, chills, headache or dysuria. PE:  Vitals:    19 0646   BP: 125/77   Pulse: 73   Resp: 16   Temp: 98 °F (36.7 °C)   SpO2: 98%       General: well nourished, well developed, in no acute distress  CV: Normal heart sounds  Resp: breathing unlabored  Abdomen: Nontender, no rebound, no guarding  FH:  Cx:    NST - Cat 1: FHR 140s, moderate variability, +accels, -decels    Labs:       Assessment:   54 y.o.  female with hx of breast cancer. DIANE pap.     Plan: 10 Hannah Jordan, DO

## 2020-01-02 ENCOUNTER — TELEPHONE (OUTPATIENT)
Dept: OBGYN CLINIC | Age: 50
End: 2020-01-02

## 2020-01-02 ENCOUNTER — OFFICE VISIT (OUTPATIENT)
Dept: OBGYN CLINIC | Age: 50
End: 2020-01-02

## 2020-01-02 VITALS
DIASTOLIC BLOOD PRESSURE: 64 MMHG | BODY MASS INDEX: 25.94 KG/M2 | SYSTOLIC BLOOD PRESSURE: 100 MMHG | WEIGHT: 165.3 LBS | HEIGHT: 67 IN

## 2020-01-02 PROCEDURE — 99024 POSTOP FOLLOW-UP VISIT: CPT | Performed by: OBSTETRICS & GYNECOLOGY

## 2020-01-15 ENCOUNTER — TELEPHONE (OUTPATIENT)
Dept: OBGYN CLINIC | Age: 50
End: 2020-01-15

## 2020-01-15 NOTE — TELEPHONE ENCOUNTER
Pt is calling because  At the F looked at her surgical pathology and told her she has cancer. She stated she is in emotional mess because she never was told at her appt on 1/2/2020. She would like to talk to Dr. Chloe Anderson as soon as possible.

## 2020-01-16 ENCOUNTER — TELEPHONE (OUTPATIENT)
Dept: OBGYN CLINIC | Age: 50
End: 2020-01-16

## 2020-01-16 NOTE — TELEPHONE ENCOUNTER
The Patient had some questions regarding what her next steps should be regarding her pathology results from her recent Hysterectomy. I informed the Patient to schedule with Gynecology Oncology per her referral from Dr. Elmira Hills. The Patient informed me that her Medical Oncologist Cameron Melgoza is aware of her results. The Patient has no new lumps, skin changes or changes in her bilat breasts and is aware to have a DX mammogram of the Right Breast completed in March prior to her follow up appointment with Dr. Sunny Curran on 3-11-20. The Patient verbalized that she would call Dr. Mary Young office after this call to ensure that her referral information was sent and get the referral information that she needs. The Patient is aware to follow up sooner than her scheduled appointment if she has any breast changes, skin changes on breasts, any new lumps, breast pain, or nipple drainage. The Patient verbalized understanding and has no further questions at this time.

## 2020-02-03 ENCOUNTER — PATIENT MESSAGE (OUTPATIENT)
Dept: SURGERY | Age: 50
End: 2020-02-03

## 2020-02-04 NOTE — TELEPHONE ENCOUNTER
From: Rah Westfall  To: Shankar Treadwell MD  Sent: 2/3/2020 4:24 PM EST  Subject: Non-Urgent Medical Question    Hi Dr. Benard Schilder! The indentation on my left breast has returned a couple months ago. The pain is the same in that area of the indentation. Is that normal? I was diagnose with invasive cervical cancer. The invasive cervical cancer is a tiny area, that spread a tiny bit to the uterus. It was missed before my hysterectomy surgery, it was found after surgery when pathology looked at it. Dr. Micky Lew is going to monitor me for 2 years with CT scans. Knowing about the 2 cancers with one not being cured because it was missed, I feel worried. If you need me in sooner then march, I will come in. If you feel its ok for me to wait until the March appointment to come in to see you that is fine.  Thank You Very Much!! :)

## 2020-02-04 NOTE — TELEPHONE ENCOUNTER
Per Dr. Benard Schilder, okay to schedule patient sooner for follow up.  lmom for patient to return office call.

## 2020-02-10 ENCOUNTER — OFFICE VISIT (OUTPATIENT)
Dept: SURGERY | Age: 50
End: 2020-02-10
Payer: COMMERCIAL

## 2020-02-10 VITALS
HEART RATE: 76 BPM | SYSTOLIC BLOOD PRESSURE: 132 MMHG | RESPIRATION RATE: 20 BRPM | BODY MASS INDEX: 25.58 KG/M2 | HEIGHT: 67 IN | DIASTOLIC BLOOD PRESSURE: 72 MMHG | WEIGHT: 163 LBS

## 2020-02-10 PROCEDURE — G8484 FLU IMMUNIZE NO ADMIN: HCPCS | Performed by: SURGERY

## 2020-02-10 PROCEDURE — 99214 OFFICE O/P EST MOD 30 MIN: CPT | Performed by: SURGERY

## 2020-02-10 PROCEDURE — G8427 DOCREV CUR MEDS BY ELIG CLIN: HCPCS | Performed by: SURGERY

## 2020-02-10 PROCEDURE — G8419 CALC BMI OUT NRM PARAM NOF/U: HCPCS | Performed by: SURGERY

## 2020-02-10 PROCEDURE — 1036F TOBACCO NON-USER: CPT | Performed by: SURGERY

## 2020-02-10 RX ORDER — ANASTROZOLE 1 MG/1
1 TABLET ORAL EVERY OTHER DAY
COMMUNITY

## 2020-02-10 RX ORDER — NICOTINE 14MG/24HR
1 PATCH, TRANSDERMAL 24 HOURS TRANSDERMAL DAILY
COMMUNITY

## 2020-02-10 RX ORDER — DOCUSATE SODIUM 100 MG/1
100 CAPSULE, LIQUID FILLED ORAL DAILY PRN
COMMUNITY

## 2020-02-10 NOTE — PROGRESS NOTES
PATIENT:    Ayesha Duvall     DATE:      2/10/2020     TIME: 11:19 AM     Subjective:     Ayesha Duvall presents for follow-up of breast cancer. Her course since Her last visit has been stable from the breast prospective but she had cervical cancer as well and treated with JARRED/BSO    The breast cancer is Cancer Staging  Carcinoma of lower-outer quadrant of left breast in female, estrogen receptor positive ([address]  Staging form: Breast, AJCC 8th Edition  - Clinical: Stage IA (cT1, cN0, cM0, G2, ER+, TX+, HER2-) - Signed by Edelmira Mortimer, MD on 8/21/2019  . Therapies completed to date include lumpectomy left with sentinel lymph node biopsy. She has completed radiation therapy to left breast. She is on anastrazole and tolerating it. She complains of left chest wall pain, especially at the inframammary fold. Patient's medications, allergies, past medical, surgical, social and family histories were reviewed and updated as appropriate. Current Outpatient Medications on File Prior to Visit   Medication Sig Dispense Refill    Saccharomyces boulardii (PROBIOTIC) 250 MG CAPS Take 1 capsule by mouth daily      docusate sodium (COLACE) 100 MG capsule Take 100 mg by mouth daily as needed for Constipation      Wheat Dextrin (BENEFIBER DRINK MIX PO) Take 1 Dose by mouth daily      anastrozole (ARIMIDEX) 1 MG tablet Take 1 mg by mouth daily      ibuprofen (ADVIL;MOTRIN) 600 MG tablet Take 600 mg by mouth every 6 hours as needed for Pain      hydrocortisone (ANUSOL-HC) 25 MG suppository UNWRAP AND INSERT 1 SUPPOSITORIES RECTALLY TWICE A DAY AS NEEDED  0    ketoconazole (NIZORAL) 2 % shampoo USE UTD BID  5    Multiple Vitamins-Minerals (MULTIVITAMIN PO) Take by mouth      Cholecalciferol (VITAMIN D3) 2000 units CAPS Take 4,000 Units by mouth       No current facility-administered medications on file prior to visit.               Objective:     Vitals:    02/10/20 1036   BP: 132/72   Pulse: 76   Resp: 20        Physical Exam  Vitals signs reviewed. Constitutional:       Appearance: Normal appearance. She is well-developed. HENT:      Head: Normocephalic and atraumatic. Nose: Nose normal.   Eyes:      Conjunctiva/sclera: Conjunctivae normal.      Right eye: No hemorrhage. Left eye: No hemorrhage. Neck:      Musculoskeletal: Normal range of motion and neck supple. Cardiovascular:      Rate and Rhythm: Normal rate. Pulmonary:      Effort: Pulmonary effort is normal. No respiratory distress. Chest:      Breasts: Breasts are symmetrical.         Right: No inverted nipple, mass, nipple discharge, skin change or tenderness. Left: Skin change (mild lymphedema of the breast and tenderness near the inframammary area. ) and tenderness present. No inverted nipple, mass or nipple discharge. Musculoskeletal: Normal range of motion. Comments: Normal Range of motion in upper and lower extremities. Lymphadenopathy:      Cervical: No cervical adenopathy. Right cervical: No superficial, deep or posterior cervical adenopathy. Left cervical: No superficial, deep or posterior cervical adenopathy. Upper Body:      Right upper body: No supraclavicular, axillary or pectoral adenopathy. Left upper body: No supraclavicular, axillary or pectoral adenopathy. Skin:     General: Skin is warm and dry. Findings: No abrasion, bruising, erythema or lesion. Neurological:      Mental Status: She is alert and oriented to person, place, and time. She is not disoriented. Psychiatric:         Speech: Speech normal.         Behavior: Behavior normal. Behavior is cooperative. Thought Content: Thought content normal.         Judgment: Judgment normal.        RADIOGRAPHIC FINDINGS:    No results found.      ASSESSMENT    Cancer Staging  Carcinoma of lower-outer quadrant of left breast in female, estrogen receptor positive ([facility] [address]  Staging form: Breast, AJCC 8th Edition  - Clinical: Stage IA (cT1, cN0, cM0, G2, ER+, DE+, HER2-) - Signed by Max Segal MD on 8/21/2019      IMPRESSION :      ICD-10-CM    1. Carcinoma of lower-outer quadrant of left breast in female, estrogen receptor positive ([facility] C50.512 [address]    Z17.0 FIDELINA DIGITAL SCREEN W CAD BILATERAL        PLAN:    Reassured the patient. No masses palpated. Suspect discomfort due to radiation. Recommend PT. Patient is doing well. Recommend clinical breast exams in the breast surgical suite in 6 month(s)  Monitor signs of lymphedema  Recommend to follow up with PT / OT  Mammography in 1 week(s)  Those on Aromatase Inhibitors should follow up with medical oncologist / PCP for monitoring bone health   Recommend an active lifestyle, healthy diet, limited alcohol intake, achieve and maintain a healthy BMI to optimize breast cancer outcomes / decrease risk of breast cancer. Discussed / Addressed integrative therapies, lifestyle modifications, and sexuality concerns  Gave patient the survivorship booklet  Survivorship Document Reviewed    Orders Placed This Encounter   Procedures    FIDELINA DIGITAL SCREEN W CAD BILATERAL     Standing Status:   Future     Standing Expiration Date:   4/10/2021   [address]     Referral Priority:   Routine     Referral Type:   Eval and Treat     Referral Reason:   Specialty Services Required     Requested Specialty:   Physical Therapy     Number of Visits Requested:   1        Return in about 7 months (around 9/10/2020) for Cancer Follow Up. Total face to face time was 30 minutes with greater than 50% spent on counseling the patient or coordinating her care. Gela Phillips MD    CC: Sima Kurtz DO,     The chief complaint, extensive medical and family history, and review of systems were asked and reviewed with the patient by me. I also reviewed the note in detail.     This note was partially generated using Dragon voice recognition system, and there may be some incorrect words, spellings, punctuation that were not noticed in checking the note before saving.

## 2020-02-10 NOTE — PROGRESS NOTES
General Information   Patient Name: Jennifer Shoemaker  Patient : 1970    Patient VJKUR:[phone]  Email: [email or web address]    Health Care Providers (Including Names, Institution)   Primary Care Provider: No primary care provider on file. Surgeon: Joe Leslie MD [facility]   Radiation Oncologist: Dr. Nino Lopes Oncologist: [facility]       Treatment Summary   Diagnosis   Cancer Staging  Carcinoma of lower-outer quadrant of left breast in female, estrogen receptor positive ([facility] [address]  Staging form: Breast, AJCC 8th Edition  - Clinical: Stage IA (cT1, cN0, cM0, G2, ER+, NH+, HER2-) - Signed by Joe Leslie MD on 2019       Diagnosis Year:    Treatment Completed   Surgery: [x] Yes   []No Surgery Date(s) (year): 19   Surgical procedure/findings:  Left Lumpectomy and SLNB on 19    Lymph node removal: []Axillary Dissection [x] [place] Biopsy Three SLN Negative for metastasis   Radiation: Yes Body area treated: Breast End Date (year):   Systemic Therapy (chemotherapy, hormonal therapy, other): Yes  [] Before surgery [] After surgery   Names of Agents Used End Dates (year)   []5-Fluorouracil     []Carboplatin     [] Cyclophosphamide    [] Docetaxel    [] Doxorubicin    [] Epirubicin     [] Methotrexate    []Paclitaxel     [] Pertuzumab    []Trastuzumab     [] Other    Treatment Ongoing   Additional treatment name Planned duration Possible Side effects   [] Tamoxifen 10 Years Hot flashes and vaginal discharge (common); endometrial cancer, serious blood clots and eye problems (all very rare). Other rare side effects may occur. [x] Aromatase Inhibitors (anastrozole, exemestane and letrozole) 5 Years Hot flashes, joint/muscle aches, vaginal dryness and bone loss (common); hair thinning (rare) Other rare side effects may occur.   [] GnRH agonist (Zoladex, Lupron) for ovarian suppression  Hot flashes and vaginal dryness (common); other rare side effects may occur. Other:     Persistent symptoms or side effects at completion of treatment:  Fatigue: No                                                                Menopausal symptoms: N/A      Numbness:  No                                                          Pain: Yes    Left Breast axilla area and Left Breast Dimpled area Psychosocial/Depression: No                                    Other (enter type(s)): Familial Cancer Risk Assessment   Breast and or ovarian cancer in 1st or 2nd degree relatives:  No        Received Genetic counseling:  Yes    Genetic testing: Yes  Genetic testing results:Negative       Follow-up Care Plan   Your follow-up care plan is design to inform you and primary care providers regarding the recommended and required follow-up, cancer screening and routine health maintenance that is needed to maintain optimal health. Possible late- and long-term effects that someone with this type of cancer and treatment may experience:  Weakening of the heart presenting as shortness of breath and swelling of legs (rare < 5%); and bones become weak and at risk for fracture (osteoporosis). It is important to remember that these symptoms can be due to other causes like diabetes or with normal aging. If these or any other new symptoms occur bring these to attention of your health care provider. These symptoms should be brought to the attention of your provider:   1. Anything that represents a brand new symptom;  2. Anything that represents a persistent symptom;  3. Anything you are worried about that might be related to the cancer coming back. Please continue to see your primary care provider for all general health care recommended for a woman your age such as routine immunizations, and routine non-breast cancer screening like colonoscopy or bone density exams. Consult with your health care provider about prevention and screening for bone loss using bone density tests.    Schedule for Clinical

## 2020-03-18 ENCOUNTER — HOSPITAL ENCOUNTER (OUTPATIENT)
Dept: WOMENS IMAGING | Age: 50
Discharge: HOME | End: 2020-03-20
Payer: COMMERCIAL

## 2020-03-18 PROCEDURE — G0279 TOMOSYNTHESIS, MAMMO: HCPCS

## 2020-05-04 ENCOUNTER — TELEPHONE (OUTPATIENT)
Dept: SURGERY | Age: 50
End: 2020-05-04

## 2020-05-04 NOTE — TELEPHONE ENCOUNTER
That's fine we had good imaging before so November is fine. . but I don't see a follow up appt with me .

## 2020-05-31 ENCOUNTER — APPOINTMENT (OUTPATIENT)
Dept: GENERAL RADIOLOGY | Age: 50
End: 2020-05-31
Payer: COMMERCIAL

## 2020-05-31 ENCOUNTER — HOSPITAL ENCOUNTER (EMERGENCY)
Age: 50
Discharge: HOME | End: 2020-05-31
Attending: FAMILY MEDICINE
Payer: COMMERCIAL

## 2020-05-31 VITALS
WEIGHT: 160 LBS | HEART RATE: 74 BPM | BODY MASS INDEX: 25.11 KG/M2 | RESPIRATION RATE: 18 BRPM | DIASTOLIC BLOOD PRESSURE: 83 MMHG | OXYGEN SATURATION: 97 % | SYSTOLIC BLOOD PRESSURE: 117 MMHG | TEMPERATURE: 98.1 F | HEIGHT: 67 IN

## 2020-05-31 PROCEDURE — 73562 X-RAY EXAM OF KNEE 3: CPT

## 2020-05-31 PROCEDURE — 99283 EMERGENCY DEPT VISIT LOW MDM: CPT

## 2020-05-31 ASSESSMENT — PAIN SCALES - GENERAL: PAINLEVEL_OUTOF10: 8

## 2020-05-31 ASSESSMENT — ENCOUNTER SYMPTOMS
RESPIRATORY NEGATIVE: 1
ABDOMINAL PAIN: 0
EYES NEGATIVE: 1
ALLERGIC/IMMUNOLOGIC NEGATIVE: 1
SHORTNESS OF BREATH: 0

## 2020-05-31 ASSESSMENT — PAIN DESCRIPTION - ORIENTATION: ORIENTATION: LEFT

## 2020-05-31 ASSESSMENT — PAIN DESCRIPTION - LOCATION: LOCATION: KNEE

## 2020-05-31 NOTE — ED PROVIDER NOTES
[facility] ED  eMERGENCY dEPARTMENT eNCOUnter      Pt Name: Alvarado Ceron  MRN: [id number]  [place] 1970  Date of evaluation: 5/31/2020  Provider: Leodan Alvarado MD    [address] [place] [address]       Chief Complaint   Patient presents with    Knee Pain     left         HISTORY OF PRESENT ILLNESS   (Location/Symptom, Timing/Onset,Context/Setting, Quality, Duration, Modifying Factors, Severity)  Note limiting factors. Alvarado Ceron is a 52 y.o. female who presents to the emergency department left knee pain     The history is provided by the patient. Knee Pain   This is a new problem. The current episode started more than 1 week ago. The problem occurs constantly. The problem has not changed since onset. Pertinent negatives include no chest pain, no abdominal pain, no headaches and no shortness of breath. The symptoms are aggravated by standing and walking. Nothing relieves the symptoms. She has tried acetaminophen for the symptoms. The treatment provided no relief. NursingNotes were reviewed. REVIEW OF SYSTEMS    (2-9 systems for level 4, 10 or more for level 5)     Review of Systems   Constitutional: Negative. HENT: Negative. Eyes: Negative. Respiratory: Negative. Negative for shortness of breath. Cardiovascular: Negative. Negative for chest pain. Gastrointestinal: Negative for abdominal pain. Endocrine: Negative. Genitourinary: Negative. Skin: Negative. Allergic/Immunologic: Negative. Neurological: Negative for headaches. Psychiatric/Behavioral: Negative. Except as noted above the remainder of the review of systems was reviewed and negative.        PAST MEDICAL HISTORY     Past Medical History:   Diagnosis Date    Abnormal Pap smear of cervix     Cancer ([facility] [address]     breast    Irritable bowel          SURGICALHISTORY       Past Surgical History:   Procedure Laterality Date    BREAST BIOPSY Left 07/29/2019    BREAST BIOPSY Left 8/29/2019    LEFT LUMPECTOMY acute fracture or dislocation. Knee immobilizer was applied for comfort patient advised to follow-up with Ortho to consider further imaging to rule out meniscal tear  Verbalized understanding and agreed with the plan      CONSULTS:  None    PROCEDURES:  Unless otherwise noted below, none     Procedures    FINAL IMPRESSION      1.  Acute pain of left knee          DISPOSITION/PLAN   DISPOSITION Decision To Discharge 05/31/2020 12:02:56 PM      PATIENT REFERRED TO:  [address] [place] [address] [address] [place] [address] [address] [facility]  In 1 day        DISCHARGE MEDICATIONS:  New Prescriptions    No medications on file          (Please note thatportions of this note were completed with a voice recognition program.  Efforts were made to edit the dictations but occasionally words are mis-transcribed.)    Deangelo Blanco MD (electronically signed)  Attending Emergency Physician          Cindi Lind MD  05/31/20 [id number]

## 2020-06-01 ENCOUNTER — CARE COORDINATION (OUTPATIENT)
Dept: CARE COORDINATION | Age: 50
End: 2020-06-01

## 2020-06-01 NOTE — CARE COORDINATION
tabletops, doorknobs, bathroom fixtures, toilets, phones, keyboards, tablets, and bedside tables. Also, clean any surfaces that may have blood, stool, or body fluids on them. Use a household cleaning spray or wipe, according to the label instructions. Labels contain instructions for safe and effective use of the cleaning product including precautions you should take when applying the product, such as wearing gloves and making sure you have good ventilation during use of the product. Monitor your symptoms  Seek prompt medical attention if your illness is worsening (e.g., difficulty breathing). Before seeking care, call your healthcare provider and tell them that you have, or are being evaluated for, COVID-19. Put on a facemask before you enter the facility. These steps will help the healthcare provider's office to keep other people in the office or waiting room from getting infected or exposed. Ask your healthcare provider to call the local or [facility] health department. Persons who are placed under If you have a medical emergency and need to call 911, notify the dispatch personnel that you have, or are being evaluated for COVID-19. If possible, put on a facemask before emergency medical services arrive. The patient agrees to contact the [facility] exposure line [phone], local health department [facility] Department of Health: ([phone]) and PCP office for questions related to their healthcare. Author provided contact information for future reference. Patient/family/caregiver given information for [address] and agrees to enroll no    Based on Loop alert triggers, patient will be contacted by nurse care manager for worsening symptoms. Reviewed Ibis with patient. She did not want to make any changes at this time.

## 2020-06-04 ENCOUNTER — OFFICE VISIT (OUTPATIENT)
Dept: ORTHOPEDIC SURGERY | Age: 50
End: 2020-06-04
Payer: COMMERCIAL

## 2020-06-04 VITALS
TEMPERATURE: 97.5 F | BODY MASS INDEX: 25.11 KG/M2 | HEART RATE: 95 BPM | WEIGHT: 160 LBS | RESPIRATION RATE: 16 BRPM | OXYGEN SATURATION: 98 % | HEIGHT: 67 IN

## 2020-06-04 PROCEDURE — 1036F TOBACCO NON-USER: CPT | Performed by: ORTHOPAEDIC SURGERY

## 2020-06-04 PROCEDURE — 99203 OFFICE O/P NEW LOW 30 MIN: CPT | Performed by: ORTHOPAEDIC SURGERY

## 2020-06-04 PROCEDURE — G8419 CALC BMI OUT NRM PARAM NOF/U: HCPCS | Performed by: ORTHOPAEDIC SURGERY

## 2020-06-04 PROCEDURE — G8427 DOCREV CUR MEDS BY ELIG CLIN: HCPCS | Performed by: ORTHOPAEDIC SURGERY

## 2020-06-13 ENCOUNTER — CARE COORDINATION (OUTPATIENT)
Dept: CARE COORDINATION | Age: 50
End: 2020-06-13

## 2020-06-15 ENCOUNTER — CARE COORDINATION (OUTPATIENT)
Dept: CARE COORDINATION | Age: 50
End: 2020-06-15

## 2020-06-24 ENCOUNTER — HOSPITAL ENCOUNTER (OUTPATIENT)
Dept: MRI IMAGING | Age: 50
Discharge: HOME | End: 2020-06-26
Payer: COMMERCIAL

## 2020-06-24 PROCEDURE — 73721 MRI JNT OF LWR EXTRE W/O DYE: CPT

## 2020-07-02 ENCOUNTER — OFFICE VISIT (OUTPATIENT)
Dept: ORTHOPEDIC SURGERY | Age: 50
End: 2020-07-02
Payer: COMMERCIAL

## 2020-07-02 VITALS
BODY MASS INDEX: 25.11 KG/M2 | OXYGEN SATURATION: 99 % | HEIGHT: 67 IN | HEART RATE: 78 BPM | TEMPERATURE: 96.5 F | WEIGHT: 160 LBS

## 2020-07-02 PROCEDURE — 1036F TOBACCO NON-USER: CPT | Performed by: ORTHOPAEDIC SURGERY

## 2020-07-02 PROCEDURE — 99212 OFFICE O/P EST SF 10 MIN: CPT | Performed by: ORTHOPAEDIC SURGERY

## 2020-07-02 PROCEDURE — G8419 CALC BMI OUT NRM PARAM NOF/U: HCPCS | Performed by: ORTHOPAEDIC SURGERY

## 2020-07-02 PROCEDURE — G8427 DOCREV CUR MEDS BY ELIG CLIN: HCPCS | Performed by: ORTHOPAEDIC SURGERY

## 2020-07-02 NOTE — PROGRESS NOTES
standard drinks     Comment: every other weekend    Drug use: Never    Sexual activity: Yes     Partners: Male   Lifestyle    Physical activity     Days per week: Not on file     Minutes per session: Not on file    Stress: Not on file   Relationships    Social connections     Talks on phone: Not on file     Gets together: Not on file     Attends Church service: Not on file     Active member of club or organization: Not on file     Attends meetings of clubs or organizations: Not on file     Relationship status: Not on file    Intimate partner violence     Fear of current or ex partner: Not on file     Emotionally abused: Not on file     Physically abused: Not on file     Forced sexual activity: Not on file   Other Topics Concern    Not on file   Social History Narrative    Not on file     Allergies   Allergen Reactions    Avelox [Moxifloxacin]     Keflex [Cephalexin]      GI Upset    Pseudoephedrine      Current Outpatient Medications on File Prior to Visit   Medication Sig Dispense Refill    anastrozole (ARIMIDEX) 1 MG tablet Take 1 mg by mouth daily      Saccharomyces boulardii (PROBIOTIC) 250 MG CAPS Take 1 capsule by mouth daily      docusate sodium (COLACE) 100 MG capsule Take 100 mg by mouth daily as needed for Constipation      Wheat Dextrin (BENEFIBER DRINK MIX PO) Take 1 Dose by mouth daily      ibuprofen (ADVIL;MOTRIN) 600 MG tablet Take 600 mg by mouth every 6 hours as needed for Pain      hydrocortisone (ANUSOL-HC) 25 MG suppository UNWRAP AND INSERT 1 SUPPOSITORIES RECTALLY TWICE A DAY AS NEEDED  0    ketoconazole (NIZORAL) 2 % shampoo USE UTD BID  5    Multiple Vitamins-Minerals (MULTIVITAMIN PO) Take by mouth      Cholecalciferol (VITAMIN D3) 2000 units CAPS Take 4,000 Units by mouth       No current facility-administered medications on file prior to visit.          Review of Systems    Objective:   Pulse 78   Temp 96.5 °F (35.8 °C) (Temporal)   Ht 5' 7\" (1.702 m)   Wt 160 lb (72.6 kg)   [facility] 10/16/2019   SpO2 99%   BMI 25.06 kg/m²   Ortho Exam   Examination of her left knee demonstrates no effusion she has no medial or lateral joint line tenderness range of motion from 0-1 20 her collateral cruciates are intact there is no instability her strength is within normal limits to clean the area of the quad hamstring. Radiographs and Laboratory Studies:     Diagnostic Imaging Studies:    I reviewed the MRI myself and do agree with the findings that there is some bone bruising medially but no sign of meniscal pathology and there is no Baker's cyst    Assessment:       Diagnosis Orders   1. Posterior left knee pain           Plan:     Continue things as is but I explained her before she stops her index she really must talk this over it extremely carefully with her oncologist.     No orders of the defined types were placed in this encounter. No orders of the defined types were placed in this encounter. No follow-ups on file.       Minnie Torres MD

## 2020-07-03 PROBLEM — M25.562 POSTERIOR LEFT KNEE PAIN: Status: ACTIVE | Noted: 2020-07-03

## 2020-07-27 ENCOUNTER — NURSE ONLY (OUTPATIENT)
Dept: PRIMARY CARE CLINIC | Age: 50
End: 2020-07-27

## 2020-07-30 LAB
SARS-COV-2: NOT DETECTED
SOURCE: NORMAL

## 2020-09-01 ENCOUNTER — HOSPITAL ENCOUNTER (EMERGENCY)
Age: 50
Discharge: HOME | End: 2020-09-01
Attending: STUDENT IN AN ORGANIZED HEALTH CARE EDUCATION/TRAINING PROGRAM
Payer: COMMERCIAL

## 2020-09-01 VITALS
WEIGHT: 160 LBS | BODY MASS INDEX: 25.11 KG/M2 | OXYGEN SATURATION: 97 % | SYSTOLIC BLOOD PRESSURE: 127 MMHG | HEART RATE: 81 BPM | HEIGHT: 67 IN | RESPIRATION RATE: 16 BRPM | TEMPERATURE: 98.3 F | DIASTOLIC BLOOD PRESSURE: 72 MMHG

## 2020-09-01 LAB
MONO TEST: NEGATIVE
STREP GRP A PCR: NEGATIVE

## 2020-09-01 PROCEDURE — 87651 STREP A DNA AMP PROBE: CPT

## 2020-09-01 PROCEDURE — 99283 EMERGENCY DEPT VISIT LOW MDM: CPT

## 2020-09-01 PROCEDURE — 86308 HETEROPHILE ANTIBODY SCREEN: CPT

## 2020-09-01 PROCEDURE — 36415 COLL VENOUS BLD VENIPUNCTURE: CPT

## 2020-09-01 PROCEDURE — 6370000000 HC RX 637 (ALT 250 FOR IP): Performed by: STUDENT IN AN ORGANIZED HEALTH CARE EDUCATION/TRAINING PROGRAM

## 2020-09-01 RX ORDER — CLINDAMYCIN HYDROCHLORIDE 150 MG/1
300 CAPSULE ORAL 4 TIMES DAILY
Qty: 80 CAPSULE | Refills: 0 | Status: SHIPPED | OUTPATIENT
Start: 2020-09-01 | End: 2020-09-11

## 2020-09-01 RX ORDER — MELOXICAM 15 MG/1
15 TABLET ORAL DAILY
Qty: 7 TABLET | Refills: 0 | Status: SHIPPED | OUTPATIENT
Start: 2020-09-01 | End: 2021-01-25

## 2020-09-01 RX ORDER — LIDOCAINE HYDROCHLORIDE 20 MG/ML
5 SOLUTION OROPHARYNGEAL PRN
Qty: 100 ML | Refills: 0 | Status: SHIPPED | OUTPATIENT
Start: 2020-09-01 | End: 2021-01-25

## 2020-09-01 RX ORDER — CLINDAMYCIN HYDROCHLORIDE 150 MG/1
300 CAPSULE ORAL ONCE
Status: COMPLETED | OUTPATIENT
Start: 2020-09-01 | End: 2020-09-01

## 2020-09-01 RX ORDER — LIDOCAINE HYDROCHLORIDE 20 MG/ML
5 SOLUTION OROPHARYNGEAL ONCE
Status: DISCONTINUED | OUTPATIENT
Start: 2020-09-01 | End: 2020-09-01 | Stop reason: HOSPADM

## 2020-09-01 RX ADMIN — CLINDAMYCIN HYDROCHLORIDE 300 MG: 150 CAPSULE ORAL at 08:35

## 2020-09-01 ASSESSMENT — PAIN SCALES - GENERAL: PAINLEVEL_OUTOF10: 7

## 2020-09-01 ASSESSMENT — ENCOUNTER SYMPTOMS
CHEST TIGHTNESS: 0
SHORTNESS OF BREATH: 0
COUGH: 0
VOICE CHANGE: 0
DIARRHEA: 0
SORE THROAT: 1
TROUBLE SWALLOWING: 0
BACK PAIN: 0
SINUS PRESSURE: 0
ABDOMINAL PAIN: 0
VOMITING: 0
NAUSEA: 0

## 2020-09-01 ASSESSMENT — PAIN DESCRIPTION - LOCATION: LOCATION: THROAT

## 2020-09-01 ASSESSMENT — PAIN DESCRIPTION - DESCRIPTORS: DESCRIPTORS: ACHING

## 2020-09-01 ASSESSMENT — PAIN DESCRIPTION - PAIN TYPE: TYPE: ACUTE PAIN

## 2020-09-01 NOTE — ED PROVIDER NOTES
myalgias. Skin: Negative for pallor and rash. Neurological: Negative for syncope, weakness and headaches. Hematological: Does not bruise/bleed easily. All other systems reviewed and are negative. Except as noted above the remainder of the review of systems was reviewed and negative. PAST MEDICAL HISTORY     Past Medical History:   Diagnosis Date    Abnormal Pap smear of cervix     Cancer ([facility] [address]     breast    Irritable bowel          SURGICALHISTORY       Past Surgical History:   Procedure Laterality Date    BREAST BIOPSY Left 07/29/2019    BREAST BIOPSY Left 8/29/2019    LEFT LUMPECTOMY AND  SENTINEL LYMPH NODE BIOPSY performed by Hubert Galloway MD at [address], VAGINAL N/A 12/16/2019    D/C WITH FROZEN SECTION, [facility] BSO performed by Brunilda Phoenix DO at [address] LIVER BIOPSY      OVARIAN CYST REMOVAL      [place]    TUBAL LIGATION           CURRENT MEDICATIONS       Previous Medications    ANASTROZOLE (ARIMIDEX) 1 MG TABLET    Take 1 mg by mouth daily    CHOLECALCIFEROL (VITAMIN D3) 2000 UNITS CAPS    Take 4,000 Units by mouth    DOCUSATE SODIUM (COLACE) 100 MG CAPSULE    Take 100 mg by mouth daily as needed for Constipation    HYDROCORTISONE (ANUSOL-HC) 25 MG SUPPOSITORY    UNWRAP AND INSERT 1 SUPPOSITORIES RECTALLY TWICE A DAY AS NEEDED    KETOCONAZOLE (NIZORAL) 2 % SHAMPOO    USE UTD BID    MULTIPLE VITAMINS-MINERALS (MULTIVITAMIN PO)    Take by mouth    SACCHAROMYCES BOULARDII (PROBIOTIC) 250 MG CAPS    Take 1 capsule by mouth daily    WHEAT DEXTRIN (BENEFIBER DRINK MIX PO)    Take 1 Dose by mouth daily       ALLERGIES     Avelox [moxifloxacin];  Keflex [cephalexin]; and Pseudoephedrine    FAMILY HISTORY       Family History   Problem Relation Age of Onset    Heart Attack Maternal Uncle     Diabetes Maternal Grandfather     Heart Attack Paternal Uncle           SOCIAL HISTORY       Social History     Socioeconomic History    Marital status:      Spouse name: None    Number of children: None    Years of education: None    Highest education level: None   Occupational History    None   Social Needs    Financial resource strain: None    Food insecurity     Worry: None     Inability: None    Transportation needs     Medical: None     Non-medical: None   Tobacco Use    Smoking status: Former Smoker     Packs/day: 0.50     Years: 30.00     Pack years: 15.00     Last attempt to quit: 2019     Years since quittin.3    Smokeless tobacco: Never Used   Substance and Sexual Activity    Alcohol use: Yes     Alcohol/week: 0.0 - 8.0 standard drinks     Comment: every other weekend    Drug use: Never    Sexual activity: Yes     Partners: Male   Lifestyle    Physical activity     Days per week: None     Minutes per session: None    Stress: None   Relationships    Social connections     Talks on phone: None     Gets together: None     Attends Faith service: None     Active member of club or organization: None     Attends meetings of clubs or organizations: None     Relationship status: None    Intimate partner violence     Fear of current or ex partner: None     Emotionally abused: None     Physically abused: None     Forced sexual activity: None   Other Topics Concern    None   Social History Narrative    None       SCREENINGS      @FLOW([id number])@      PHYSICAL EXAM    (up to 7 for level 4, 8 or more for level 5)     ED Triage Vitals [20 0700]   BP Temp Temp Source Pulse Resp SpO2 Height Weight   127/72 98.3 °F (36.8 °C) Oral 81 16 97 % 5' 7\" (1.702 m) 160 lb (72.6 kg)       Physical Exam  Vitals signs and nursing note reviewed. Constitutional:       General: She is awake. She is not in acute distress. Appearance: Normal appearance. She is well-developed and normal weight. She is not ill-appearing, toxic-appearing or diaphoretic. Comments: No photophobia. No phonophobia.    HENT:      Head: Normocephalic and atraumatic. No Cooney's sign. Right Ear: Ear canal and external ear normal.      Left Ear: Ear canal and external ear normal.      Ears:      Comments: No mastoid tenderness. Nose: Nose normal. No congestion or rhinorrhea. Mouth/Throat:      Lips: Pink. Mouth: Mucous membranes are moist.      Pharynx: Pharyngeal swelling, oropharyngeal exudate, posterior oropharyngeal erythema and uvula swelling present. Tonsils: Tonsillar exudate present. 4+ on the right. 4+ on the left. Comments: No drooling. No trismus. Eyes:      General: No scleral icterus. Right eye: No foreign body or discharge. Left eye: No discharge. Extraocular Movements: Extraocular movements intact. Right eye: Normal extraocular motion. Left eye: Normal extraocular motion. Conjunctiva/sclera: Conjunctivae normal.      Left eye: No exudate. Pupils: Pupils are equal, round, and reactive to light. Neck:      Musculoskeletal: Full passive range of motion without pain, normal range of motion and neck supple. Normal range of motion. No edema, erythema, neck rigidity, crepitus, injury, pain with movement, torticollis, spinous process tenderness or muscular tenderness. Thyroid: No thyroid mass. Vascular: No carotid bruit, hepatojugular reflux or JVD. Trachea: Trachea and phonation normal. No tracheal tenderness, tracheostomy, abnormal tracheal secretions or tracheal deviation. Comments: No meningismus. Cardiovascular:      Rate and Rhythm: Normal rate and regular rhythm. Heart sounds: Normal heart sounds. Heart sounds not distant. No murmur. No friction rub. No gallop. Pulmonary:      Effort: Pulmonary effort is normal. No respiratory distress. Breath sounds: Normal breath sounds. No stridor. No wheezing, rhonchi or rales. Chest:      Chest wall: No tenderness. Abdominal:      General: Abdomen is flat.  Bowel sounds are normal. There is no distension. Palpations: Abdomen is soft. There is no mass. Tenderness: There is no abdominal tenderness. There is no right CVA tenderness, left CVA tenderness, guarding or rebound. Hernia: No hernia is present. Musculoskeletal: Normal range of motion. General: No swelling, tenderness, deformity or signs of injury. Lymphadenopathy:      Head:      Right side of head: No submental adenopathy. Left side of head: No submental adenopathy. Cervical: Cervical adenopathy present. Right cervical: Superficial cervical adenopathy and posterior cervical adenopathy present. Left cervical: No superficial, deep or posterior cervical adenopathy. Skin:     General: Skin is warm and dry. Capillary Refill: Capillary refill takes less than 2 seconds. Coloration: Skin is not jaundiced or pale. Findings: No bruising, erythema, lesion or rash. Neurological:      General: No focal deficit present. Mental Status: She is alert and oriented to person, place, and time. Mental status is at baseline. Cranial Nerves: No cranial nerve deficit. Sensory: No sensory deficit. Motor: No weakness. Coordination: Coordination normal.      Deep Tendon Reflexes: Reflexes are normal and symmetric. Psychiatric:         Mood and Affect: Mood normal.         Behavior: Behavior normal. Behavior is cooperative. Thought Content:  Thought content normal.         Judgment: Judgment normal.         DIAGNOSTIC RESULTS     EKG: All EKG's are interpreted by the Emergency Department Physician who either signs or Co-signsthis chart in the absence of a cardiologist.        RADIOLOGY:   Non-plain filmimages such as CT, Ultrasound and MRI are read by the radiologist. Plain radiographic images are visualized and preliminarily interpreted by the emergency physician with the below findings:        Interpretation per the Radiologist below, if available at the time ofthis

## 2020-09-01 NOTE — ED TRIAGE NOTES
Pt c/o sore throat, painful swallowing and swollen lymph nodes on the left side of her neck. States she has not been feeling well for the past 2-3 weeks. States her work sent her home this morning for failing the Covid screening and now she needs a note to return to work. Pt states she thinks she has white patches in the back of her throat. States she is a little concerned about the swollen lymph nodes because she has had lymph nodes on that side removed due to breast cancer. Pt alert and oriented x 4. Skin pink, warm, dry. Respirations even and unlabored. No distress noted at this time.

## 2020-09-01 NOTE — ED NOTES
Pt sitting in chair  No signs of distress  Denies needs  Call light within reach  Will continue to monitor      [address] V, RN  09/01/20 2373

## 2020-09-02 ENCOUNTER — CARE COORDINATION (OUTPATIENT)
Dept: CARE COORDINATION | Age: 50
End: 2020-09-02

## 2020-09-02 NOTE — CARE COORDINATION
COVID-19 ED/Flu Clinic Initial Outreach Note    Patient contacted regarding recent visit for viral symptoms. This Rani Topete contacted the patient by telephone to perform post discharge call. Verified name and  with patient as identifiers. Provided introduction to self, and reason for call due to viral symptoms of infection and/or exposure to COVID-19. Patient presented to emergency department/flu clinic with complaints of viral symptoms/exposure to COVID. Patient reports symptoms are improving. Due to no new or worsening symptoms the RN CTN/KRISTA was not notified for escalation. Discussed exposure protocols and quarantine with CDC Guidelines What To Do If You Are Sick    Patient was given an opportunity for questions and concerns. Stay home except to get medical care  People who are mildly ill with COVID-19 are able to isolate at home during their illness. You should restrict activities outside your home, except for getting medical care. Do not go to work, school, or public areas. Avoid using public transportation, ride-sharing, or taxis. Separate yourself from other people and animals in your home  People: As much as possible, you should stay in a specific room and away from other people in your home. Also, you should use a separate bathroom, if available. Animals: You should restrict contact with pets and other animals while you are sick with COVID-19, just like you would around other people. Although there have not been reports of pets or other animals becoming sick with COVID-19, it is still recommended that people sick with COVID-19 limit contact with animals until more information is known about the virus. When possible, have another member of your household care for your animals while you are sick. If you are sick with COVID-19, avoid contact with your pet, including petting, snuggling, being kissed or licked, and sharing food.  If you must care for your pet or be around animals while you are sick, wash your hands before and after you interact with pets and wear a facemask. Call ahead before visiting your doctor  If you have a medical appointment, call the healthcare provider and tell them that you have or may have COVID-19. This will help the healthcare provider's office take steps to keep other people from getting infected or exposed. Wear a facemask  You should wear a facemask when you are around other people (e.g., sharing a room or vehicle) or pets and before you enter a healthcare provider's office. If you are not able to wear a facemask (for example, because it causes trouble breathing), then people who live with you should not stay in the same room with you, or they should wear a facemask if they enter your room. Cover your coughs and sneezes  Cover your mouth and nose with a tissue when you cough or sneeze. Throw used tissues in a lined trash can. Immediately wash your hands with soap and water for at least 20 seconds or, if soap and water are not available, clean your hands with an alcohol-based hand  that contains at least 60% alcohol. Clean your hands often  Wash your hands often with soap and water for at least 20 seconds, especially after blowing your nose, coughing, or sneezing; going to the bathroom; and before eating or preparing food. If soap and water are not readily available, use an alcohol-based hand  with at least 60% alcohol, covering all surfaces of your hands and rubbing them together until they feel dry. Soap and water are the best option if hands are visibly dirty. Avoid touching your eyes, nose, and mouth with unwashed hands. Avoid sharing personal household items  You should not share dishes, drinking glasses, cups, eating utensils, towels, or bedding with other people or pets in your home. After using these items, they should be washed thoroughly with soap and water.   Clean all high-touch surfaces everyday  High touch surfaces include counters,

## 2020-11-05 ENCOUNTER — HOSPITAL ENCOUNTER (EMERGENCY)
Age: 50
Discharge: HOME | End: 2020-11-05
Attending: EMERGENCY MEDICINE
Payer: COMMERCIAL

## 2020-11-05 VITALS
SYSTOLIC BLOOD PRESSURE: 111 MMHG | TEMPERATURE: 99.2 F | DIASTOLIC BLOOD PRESSURE: 82 MMHG | RESPIRATION RATE: 16 BRPM | HEART RATE: 77 BPM | BODY MASS INDEX: 25.06 KG/M2 | OXYGEN SATURATION: 96 % | WEIGHT: 160 LBS

## 2020-11-05 PROCEDURE — U0002 COVID-19 LAB TEST NON-CDC: HCPCS

## 2020-11-05 PROCEDURE — 99283 EMERGENCY DEPT VISIT LOW MDM: CPT

## 2020-11-05 ASSESSMENT — ENCOUNTER SYMPTOMS
VOMITING: 0
SORE THROAT: 0
EYE DISCHARGE: 0
PHOTOPHOBIA: 0
ABDOMINAL PAIN: 0
ABDOMINAL DISTENTION: 0
COUGH: 0
SHORTNESS OF BREATH: 0
WHEEZING: 0
CHEST TIGHTNESS: 0

## 2020-11-06 ENCOUNTER — CARE COORDINATION (OUTPATIENT)
Dept: CARE COORDINATION | Age: 50
End: 2020-11-06

## 2020-11-06 LAB — SARS-COV-2, PCR: DETECTED

## 2020-11-06 NOTE — ED NOTES
COVID test sent. Pt tolerated well. Pt sitting on bed. No s/s distress. States she needs COVID result to return to work. Resps even and unlabored. Will continue to monitor.        Briseida Madison RN  11/05/20 2050       Briseida Madison RN  11/05/20 2050

## 2020-11-06 NOTE — CARE COORDINATION
Case referred to this writer by KRISTA Georges to assist patient with community resources. Telephone call to patient who relayed she tested positive for COVID-19 and cannot return to work. She is concerned about the financial impact of her and possibly her spouse not being able to work. She noted that she has a mortgage to pay. She is unsure at this time if she has any personal time for pay from her employer. She is waiting to hear from her employer about this issue. Provided patient with contact information on [facility].  Discussed food and provided patient with information on food distributions tomorrow/[employer]. Also provided patient with contact information for [employer]. Provided patient with the following resources to assist with rent and utilities:  [employer], [facility] [email or web address],  and [employer].

## 2020-11-06 NOTE — ED TRIAGE NOTES
Pt presents from home with c/o fever. Onset x2 days. Pt reports self admin advil x4 hrs ago. Pt a&o x4. resp even,unlabored. Pt denies sob. ls clear. Skin warm, clammy to touch. Oral temp 99. 2. pt reports increasing fatigue, body aches. Denies n/v, denies cough.

## 2020-11-06 NOTE — ED PROVIDER NOTES
[facility] ED  eMERGENCY dEPARTMENT eNCOUnter      Pt Name: Katey Hurley  MRN: [id number]  [place] 1970  Date of evaluation: 11/5/2020  Provider: Eleanor Austin MD    CHIEF COMPLAINT       Chief Complaint   Patient presents with    Fever         HISTORY OF PRESENT ILLNESS   (Location/Symptom, Timing/Onset,Context/Setting, Quality, Duration, Modifying Factors, Severity)  Note limiting factors. Katey Hurley is a 52 y.o. female who presents to the emergency department for evaluation of fever. Patient developed a fever in the past 24 hours. She states she feels a little bit achy but otherwise no other complaints. Denies cough shortness of breath or chest pain. No sore throat. No congestion. No change in taste or smell. She states she feels this way every 6 months or so with a viral illness. 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 HPI    NursingNotes were reviewed. REVIEW OF SYSTEMS    (2-9 systems for level 4, 10 or more for level 5)     Review of Systems   Constitutional: Positive for fatigue and fever. Negative for chills and diaphoresis. HENT: Negative for congestion, ear pain, mouth sores and sore throat. Eyes: Negative for photophobia and discharge. Respiratory: Negative for cough, chest tightness, shortness of breath and wheezing. Cardiovascular: Negative for chest pain and palpitations. Gastrointestinal: Negative for abdominal distention, abdominal pain and vomiting. Endocrine: Negative for cold intolerance. Genitourinary: Negative for difficulty urinating. Musculoskeletal: Negative for arthralgias. Skin: Negative for pallor and rash. Allergic/Immunologic: Negative for immunocompromised state. Neurological: Negative for dizziness and syncope. Hematological: Negative for adenopathy. Psychiatric/Behavioral: Negative for agitation and hallucinations.    All other systems reviewed and are negative. Except as noted above the remainder of the review of systems was reviewed and negative. PAST MEDICAL HISTORY     Past Medical History:   Diagnosis Date    Abnormal Pap smear of cervix     Cancer ([address]     breast    Irritable bowel          SURGICALHISTORY       Past Surgical History:   Procedure Laterality Date    BREAST BIOPSY Left 07/29/2019    BREAST BIOPSY Left 8/29/2019    LEFT LUMPECTOMY AND  SENTINEL LYMPH NODE BIOPSY performed by Kushal Newberry MD at [address]2019    D/C WITH FROZEN SECTION, [facility] BSO performed by Sonia Robison DO at [address] LIVER BIOPSY      OVARIAN CYST REMOVAL      [place]    TUBAL LIGATION           CURRENT MEDICATIONS       Previous Medications    ANASTROZOLE (ARIMIDEX) 1 MG TABLET    Take 1 mg by mouth daily    CHOLECALCIFEROL (VITAMIN D3) 2000 UNITS CAPS    Take 4,000 Units by mouth    DOCUSATE SODIUM (COLACE) 100 MG CAPSULE    Take 100 mg by mouth daily as needed for Constipation    HYDROCORTISONE (ANUSOL-HC) 25 MG SUPPOSITORY    UNWRAP AND INSERT 1 SUPPOSITORIES RECTALLY TWICE A DAY AS NEEDED    KETOCONAZOLE (NIZORAL) 2 % SHAMPOO    USE UTD BID    LIDOCAINE VISCOUS HCL (XYLOCAINE) 2 % SOLN SOLUTION    Take 5 mLs by mouth as needed for Irritation    MELOXICAM (MOBIC) 15 MG TABLET    Take 1 tablet by mouth daily for 7 days    MULTIPLE VITAMINS-MINERALS (MULTIVITAMIN PO)    Take by mouth    SACCHAROMYCES BOULARDII (PROBIOTIC) 250 MG CAPS    Take 1 capsule by mouth daily    WHEAT DEXTRIN (BENEFIBER DRINK MIX PO)    Take 1 Dose by mouth daily       ALLERGIES     Avelox [moxifloxacin];  Keflex [cephalexin]; and Pseudoephedrine    FAMILY HISTORY       Family History   Problem Relation Age of Onset    Heart Attack Maternal Uncle     Diabetes Maternal Grandfather     Heart Attack Paternal Uncle           SOCIAL HISTORY       Social History     Socioeconomic History  Marital status:      Spouse name: None    Number of children: None    Years of education: None    Highest education level: None   Occupational History    None   Social Needs    Financial resource strain: None    Food insecurity     Worry: None     Inability: None    Transportation needs     Medical: None     Non-medical: None   Tobacco Use    Smoking status: Former Smoker     Packs/day: 0.50     Years: 30.00     Pack years: 15.00     Last attempt to quit: 2019     Years since quittin.5    Smokeless tobacco: Never Used   Substance and Sexual Activity    Alcohol use: Yes     Alcohol/week: 0.0 - 8.0 standard drinks     Comment: every other weekend    Drug use: Never    Sexual activity: Yes     Partners: Male   Lifestyle    Physical activity     Days per week: None     Minutes per session: None    Stress: None   Relationships    Social connections     Talks on phone: None     Gets together: None     Attends Latter-day service: None     Active member of club or organization: None     Attends meetings of clubs or organizations: None     Relationship status: None    Intimate partner violence     Fear of current or ex partner: None     Emotionally abused: None     Physically abused: None     Forced sexual activity: None   Other Topics Concern    None   Social History Narrative    None       SCREENINGS      @FLOW([id number])@      PHYSICAL EXAM    (up to 7 for level 4, 8 or more for level 5)     ED Triage Vitals [20 194]   BP Temp Temp Source Pulse Resp SpO2 Height Weight   138/84 99.2 °F (37.3 °C) Oral 99 20 97 % -- 160 lb (72.6 kg)       Physical Exam  Vitals signs and nursing note reviewed. Constitutional:       Appearance: She is well-developed. HENT:      Head: Normocephalic.       Right Ear: Tympanic membrane normal.      Left Ear: Tympanic membrane normal.      Nose: Nose normal.      Mouth/Throat:      Mouth: Mucous membranes are moist.   Eyes:      Conjunctiva/sclera: Conjunctivae normal.      Pupils: Pupils are equal, round, and reactive to light. Neck:      Musculoskeletal: Normal range of motion and neck supple. Cardiovascular:      Rate and Rhythm: Normal rate and regular rhythm. Heart sounds: Normal heart sounds. Pulmonary:      Effort: Pulmonary effort is normal.      Breath sounds: Normal breath sounds. Abdominal:      General: Bowel sounds are normal.      Palpations: Abdomen is soft. Tenderness: There is no abdominal tenderness. There is no guarding. Musculoskeletal: Normal range of motion. Skin:     General: Skin is warm and dry. Capillary Refill: Capillary refill takes less than 2 seconds. Neurological:      Mental Status: She is alert and oriented to person, place, and time. Psychiatric:         Mood and Affect: Mood normal.         DIAGNOSTIC RESULTS     EKG: All EKG's are interpreted by the Emergency Department Physician who either signs or Co-signsthis chart in the absence of a cardiologist.        RADIOLOGY:   [facility] Smoker such as CT, Ultrasound and MRI are read by the radiologist. Plain radiographic images are visualized and preliminarily interpreted by the emergency physician with the below findings:      Interpretation per the Radiologist below, if available at the time ofthis note:    No orders to display         ED BEDSIDE ULTRASOUND:   Performed by ED Physician - none    LABS:  Labs Reviewed   COVID-19       All other labs were within normal range or not returned as of this dictation. EMERGENCY DEPARTMENT COURSE and DIFFERENTIAL DIAGNOSIS/MDM:   Vitals:    Vitals:    11/05/20 1941 11/05/20 2043   BP: 138/84 (!) 119/98   Pulse: 99 78   Resp: 20 16   Temp: 99.2 °F (37.3 °C)    TempSrc: Oral    SpO2: 97% 96%   Weight: 160 lb (72.6 kg)         MDM patient is afebrile but but she did take Tylenol prior to arrival.  She does not appear to have Covid-like symptoms. She has not hypoxic.   Aced on her work environment she does require a PCR test I reviewed that with the lab it will be done here tomorrow. Discharged home improved. Return  for worsening symptoms      CONSULTS:  None    PROCEDURES:  Unless otherwise noted below, none     Procedures    FINAL IMPRESSION      1.  Fever, unspecified fever cause          DISPOSITION/PLAN   DISPOSITION Decision To Discharge 11/05/2020 09:00:40 PM      PATIENT REFERRED TO:  Cristal AguilarDO  [address] [place] [address] [phone]    In 2 days        DISCHARGE MEDICATIONS:  New Prescriptions    No medications on file          (Please note that portions of this note were completed with a voice recognition program.  Efforts were made to edit the dictations but occasionally words are mis-transcribed.)    González Wall MD (electronically signed)  Attending Emergency Physician          González Wall MD  11/05/20 2101       González Wall MD  11/05/20 2101

## 2020-11-09 ENCOUNTER — CARE COORDINATION (OUTPATIENT)
Dept: CARE COORDINATION | Age: 50
End: 2020-11-09

## 2020-11-13 ENCOUNTER — CARE COORDINATION (OUTPATIENT)
Dept: CARE COORDINATION | Age: 50
End: 2020-11-13

## 2020-11-13 NOTE — CARE COORDINATION
You Patient resolved from the Care Transitions episode on 11/13/20  Discussed COVID-19 related testing which was available at this time. Test results were positive. Patient informed of results, if available? Yes    Patient/family has been provided the following resources and education related to COVID-19:                         Signs, symptoms and red flags related to COVID-19            CDC exposure and quarantine guidelines            [facility] exposure contact - [phone]            Contact for their local Department of Health                 Patient currently reports that the following symptoms have improved:  headache, nasal congestion and fatigue     No further outreach scheduled with this CTN/ACM. Episode of Care resolved. Patient has this CTN/ACM contact information if future needs arise. Call to arya to resolve covid episode. She reports improvement in fatigue. She does have headache, and \"sinus\". Advised to contact pcpt for treatment of sinus.  She will return to work next week

## 2020-11-24 ENCOUNTER — CARE COORDINATION (OUTPATIENT)
Dept: CARE COORDINATION | Age: 50
End: 2020-11-24

## 2020-11-24 NOTE — CARE COORDINATION
Telephone call with patient. She indicated that she didn't need to use any of the resources discussed. She stated that she paniced when she got ill. She is back to work. She is waiting to hear is she was eligible for one week unemployment. No other concerns or needs were voiced at time of phone call. Will discharge from [address] [place] [address]  Patient confirmed she has this writer's contact information should needs change.

## 2020-12-08 ENCOUNTER — HOSPITAL ENCOUNTER (OUTPATIENT)
Dept: WOMENS IMAGING | Age: 50
Discharge: HOME | End: 2020-12-10
Payer: COMMERCIAL

## 2020-12-08 PROCEDURE — 77063 BREAST TOMOSYNTHESIS BI: CPT

## 2020-12-09 ENCOUNTER — HOSPITAL ENCOUNTER (OUTPATIENT)
Dept: WOMENS IMAGING | Age: 50
Discharge: HOME | End: 2020-12-11
Payer: COMMERCIAL

## 2020-12-09 PROCEDURE — 77080 DXA BONE DENSITY AXIAL: CPT

## 2021-01-25 ENCOUNTER — OFFICE VISIT (OUTPATIENT)
Dept: SURGERY | Age: 51
End: 2021-01-25
Payer: COMMERCIAL

## 2021-01-25 VITALS
RESPIRATION RATE: 18 BRPM | DIASTOLIC BLOOD PRESSURE: 74 MMHG | BODY MASS INDEX: 26.84 KG/M2 | SYSTOLIC BLOOD PRESSURE: 129 MMHG | HEIGHT: 67 IN | WEIGHT: 171 LBS | HEART RATE: 82 BPM

## 2021-01-25 DIAGNOSIS — Z17.0 CARCINOMA OF LOWER-OUTER QUADRANT OF LEFT BREAST IN FEMALE, ESTROGEN RECEPTOR POSITIVE (HCC): Primary | ICD-10-CM

## 2021-01-25 DIAGNOSIS — N64.89 BREAST ASYMMETRY: ICD-10-CM

## 2021-01-25 DIAGNOSIS — C50.512 CARCINOMA OF LOWER-OUTER QUADRANT OF LEFT BREAST IN FEMALE, ESTROGEN RECEPTOR POSITIVE (HCC): Primary | ICD-10-CM

## 2021-01-25 DIAGNOSIS — M19.90 ARTHRITIS: ICD-10-CM

## 2021-01-25 PROCEDURE — 1036F TOBACCO NON-USER: CPT | Performed by: SURGERY

## 2021-01-25 PROCEDURE — G8484 FLU IMMUNIZE NO ADMIN: HCPCS | Performed by: SURGERY

## 2021-01-25 PROCEDURE — G8419 CALC BMI OUT NRM PARAM NOF/U: HCPCS | Performed by: SURGERY

## 2021-01-25 PROCEDURE — 99214 OFFICE O/P EST MOD 30 MIN: CPT | Performed by: SURGERY

## 2021-01-25 PROCEDURE — G8427 DOCREV CUR MEDS BY ELIG CLIN: HCPCS | Performed by: SURGERY

## 2021-01-25 NOTE — PROGRESS NOTES
FOLLOW UP NOTE    PATIENT:   Deloris Kaye    DATE:      1/25/21    HISTORY AND CHIEF COMPLAINT:    Deloris Kaye  is a 52y.o.  year old  female who presents for a follow up of left breast cancer. She is on Anastrazole every other day. She has worsening arthritis. She has talked to med onc who gave her another prescription but she did not take it. Cancer Staging  Carcinoma of lower-outer quadrant of left breast in female, estrogen receptor positive ([facility] [address]  Staging form: Breast, AJCC 8th Edition  - Clinical: Stage IA (cT1, cN0, cM0, G2, ER+, IN+, HER2-) - Signed by Misael Garcia MD on 8/21/2019       She does not perform self breast exams. She denies any breast pain, masses, skin changes, nipple discharge, nipple retraction, or recent breast trauma  . PHYSICAL EXAMINATION:    Vitals:    01/25/21 1019   BP: 129/74   Pulse: 82   Resp: 18        Physical Exam  Vitals signs reviewed. Constitutional:       Appearance: Normal appearance. She is well-developed. HENT:      Head: Normocephalic and atraumatic. Nose: Nose normal.   Eyes:      Conjunctiva/sclera: Conjunctivae normal.      Right eye: No hemorrhage. Left eye: No hemorrhage. Neck:      Musculoskeletal: Normal range of motion and neck supple. Cardiovascular:      Rate and Rhythm: Normal rate. Pulmonary:      Effort: Pulmonary effort is normal. No respiratory distress. Chest:      Breasts: Breasts are asymmetrical (slight left smaller than right due to lumpectomy). Right: Skin change (radiation skin changes) present. No inverted nipple, mass, nipple discharge or tenderness. Left: No inverted nipple, mass, nipple discharge, skin change or tenderness. Musculoskeletal: Normal range of motion. Comments: Normal Range of motion in upper and lower extremities. Right wrist in a splint   Lymphadenopathy:      Cervical: No cervical adenopathy.       Right cervical: No superficial, deep or posterior cervical adenopathy. Left cervical: No superficial, deep or posterior cervical adenopathy. Upper Body:      Right upper body: No supraclavicular, axillary or pectoral adenopathy. Left upper body: No supraclavicular, axillary or pectoral adenopathy. Skin:     General: Skin is warm and dry. Findings: No abrasion, bruising, erythema or lesion. Neurological:      Mental Status: She is alert and oriented to person, place, and time. She is not disoriented. Psychiatric:         Speech: Speech normal.         Behavior: Behavior normal. Behavior is cooperative. Thought Content: Thought content normal.         Judgment: Judgment normal.       IMAGING:    In epic. Assessment     IMPRESSION:      ICD-10-CM    1. Carcinoma of lower-outer quadrant of left breast in female, estrogen receptor positive ([facility]  C50.512     Z17.0    2. Arthritis  M19.90         PLAN:    Patient is doing well. Recommend clinical breast exams in the breast surgical suite in 6 month(s)  Mammography in 1 year(s)  Those on Aromatase Inhibitors should follow up with medical oncologist / PCP for monitoring bone health   Recommend an active lifestyle, healthy diet, limited alcohol intake, achieve and maintain a healthy BMI to optimize breast cancer outcomes / decrease risk of breast cancer. Follow up with medical oncology  Follow up with PCP   Referral to Dr. Keri Morgan for asymmetry    Total face to face time was 35 minutes with greater than 50% spent on counseling the patient or coordinating her care. Dictated by Kym Puentes MD  1/25/21     This note was partially generated using Dragon voice recognition system, and there may be some incorrect words, spellings, punctuation that were not noticed in checking the note before saving.

## 2021-01-25 NOTE — PROGRESS NOTES
Reason for today's visit:  6 month follow up    Palpates a breast change? no  Nipple discharge: no  Breast Pain: yes - achiness left Breast  Breast Mass: no  Swelling in either upper extremity? no    Wellness:    Self breast self exams: no, does not like to examine self anymore  Regular exercise routine: yes - starting walking  Following Lymphedema awareness/precautions: yes   Managing stress:yes   Stress level on a scale of 1 - 10: 10, does not want to talk to anyone today.  Sees counselor at the [facility]    Instruction:    Follow up per provider  Imaging per provider  Monthly self breast exams  Healthy diet  Exercise program  Lymphedema precautions/awareness    Other:    Requisitions needed:no  Bras/prosthesis: no  Compression Sleeve/glove: no  Lymphedema Measurements: see flow sheet  Therapy: no  PT/OT/Lymphedema: no  Follow up as advised per Dr Gale Rehman

## 2021-02-06 LAB
ALBUMIN SERPL-MCNC: 4.2 G/DL (ref 3.5–4.6)
ALP BLD-CCNC: 119 U/L (ref 40–130)
ALT SERPL-CCNC: 15 U/L (ref 0–33)
ANION GAP SERPL CALCULATED.3IONS-SCNC: 12 MEQ/L (ref 9–15)
AST SERPL-CCNC: 16 U/L (ref 0–35)
BILIRUB SERPL-MCNC: 0.6 MG/DL (ref 0.2–0.7)
BUN BLDV-MCNC: 17 MG/DL (ref 6–20)
CALCIUM SERPL-MCNC: 9.3 MG/DL (ref 8.5–9.9)
CHLORIDE BLD-SCNC: 108 MEQ/L (ref 95–107)
CO2: 22 MEQ/L (ref 20–31)
CREAT SERPL-MCNC: 0.69 MG/DL (ref 0.5–0.9)
FOLATE: >20 NG/ML (ref 7.3–26.1)
GFR AFRICAN AMERICAN: >60
GFR NON-AFRICAN AMERICAN: >60
GLOBULIN: 3.2 G/DL (ref 2.3–3.5)
GLUCOSE BLD-MCNC: 97 MG/DL (ref 70–99)
HBA1C MFR BLD: 5.6 % (ref 4.8–5.9)
HCT VFR BLD CALC: 40.7 % (ref 37–47)
HEMOGLOBIN: 13.6 G/DL (ref 12–16)
MCH RBC QN AUTO: 30.9 PG (ref 27–31.3)
MCHC RBC AUTO-ENTMCNC: 33.5 % (ref 33–37)
MCV RBC AUTO: 92.1 FL (ref 82–100)
PDW BLD-RTO: 13.5 % (ref 11.5–14.5)
PLATELET # BLD: 377 K/UL (ref 130–400)
POTASSIUM SERPL-SCNC: 4 MEQ/L (ref 3.4–4.9)
RBC # BLD: 4.42 M/UL (ref 4.2–5.4)
SODIUM BLD-SCNC: 142 MEQ/L (ref 135–144)
TOTAL PROTEIN: 7.4 G/DL (ref 6.3–8)
TSH SERPL DL<=0.05 MIU/L-ACNC: 0.75 UIU/ML (ref 0.44–3.86)
VITAMIN B-12: 728 PG/ML (ref 232–1245)
VITAMIN D 25-HYDROXY: 44.8 NG/ML (ref 30–100)
WBC # BLD: 5.6 K/UL (ref 4.8–10.8)

## 2021-02-10 LAB
ALBUMIN SERPL-MCNC: 4.4 G/DL (ref 3.75–5.01)
ALPHA-1-GLOBULIN: 0.27 G/DL (ref 0.19–0.46)
ALPHA-2-GLOBULIN: 0.82 G/DL (ref 0.48–1.05)
BETA GLOBULIN: 1.22 G/DL (ref 0.48–1.1)
GAMMA GLOBULIN: 0.99 G/DL (ref 0.62–1.51)
PROTEIN ELECTROPHORESIS, SERUM: ABNORMAL
SPE/IFE INTERPRETATION: ABNORMAL
TOTAL PROTEIN: 7.7 G/DL (ref 6.3–8.2)

## 2021-02-11 LAB
ANTINUCLEAR AB INTERPRETIVE COMMENT: NORMAL
ANTINUCLEAR ANTIBODY, HEP-2, IGG: NORMAL

## 2021-02-21 LAB
EER IMMUNOFIX ELECTROPHORESIS GEL: NORMAL
IMMUNOFIX ELECTROPHORESIS GEL: NORMAL

## 2021-07-07 ENCOUNTER — HOSPITAL ENCOUNTER (OUTPATIENT)
Dept: GENERAL RADIOLOGY | Age: 51
Discharge: HOME | End: 2021-07-09
Payer: COMMERCIAL

## 2021-07-07 DIAGNOSIS — C90.00 MYELOMA ASSOCIATED AMYLOIDOSIS (HCC): ICD-10-CM

## 2021-07-07 DIAGNOSIS — D47.2 MGUS (MONOCLONAL GAMMOPATHY OF UNKNOWN SIGNIFICANCE): ICD-10-CM

## 2021-07-07 DIAGNOSIS — E85.9 MYELOMA ASSOCIATED AMYLOIDOSIS (HCC): ICD-10-CM

## 2021-07-07 DIAGNOSIS — C50.112 MALIGNANT NEOPLASM OF CENTRAL PORTION OF LEFT FEMALE BREAST, UNSPECIFIED ESTROGEN RECEPTOR STATUS (HCC): ICD-10-CM

## 2021-07-07 PROCEDURE — 77075 RADEX OSSEOUS SURVEY COMPL: CPT

## 2021-07-21 ENCOUNTER — TELEPHONE (OUTPATIENT)
Dept: SURGERY | Age: 51
End: 2021-07-21

## 2021-07-21 DIAGNOSIS — N64.52 NIPPLE DISCHARGE: Primary | ICD-10-CM

## 2021-07-21 DIAGNOSIS — N64.4 BREAST PAIN, RIGHT: ICD-10-CM

## 2021-07-21 NOTE — TELEPHONE ENCOUNTER
Per Dr. Loulou Ledesma, place orders for patient to have diagnostic imaging on the right breast. Patient is aware and scheduled for next day 7/22/21

## 2021-07-22 ENCOUNTER — HOSPITAL ENCOUNTER (OUTPATIENT)
Dept: WOMENS IMAGING | Age: 51
Discharge: HOME | End: 2021-07-24
Payer: COMMERCIAL

## 2021-07-22 DIAGNOSIS — N64.4 BREAST PAIN, RIGHT: ICD-10-CM

## 2021-07-22 DIAGNOSIS — N64.52 NIPPLE DISCHARGE: ICD-10-CM

## 2021-07-22 PROCEDURE — G0279 TOMOSYNTHESIS, MAMMO: HCPCS

## 2021-08-23 ENCOUNTER — OFFICE VISIT (OUTPATIENT)
Dept: SURGERY | Age: 51
End: 2021-08-23
Payer: COMMERCIAL

## 2021-08-23 VITALS
WEIGHT: 157.4 LBS | DIASTOLIC BLOOD PRESSURE: 71 MMHG | SYSTOLIC BLOOD PRESSURE: 118 MMHG | TEMPERATURE: 97.3 F | BODY MASS INDEX: 24.71 KG/M2 | HEIGHT: 67 IN | RESPIRATION RATE: 16 BRPM | HEART RATE: 82 BPM

## 2021-08-23 DIAGNOSIS — Z17.0 CARCINOMA OF LOWER-OUTER QUADRANT OF LEFT BREAST IN FEMALE, ESTROGEN RECEPTOR POSITIVE (HCC): Primary | ICD-10-CM

## 2021-08-23 DIAGNOSIS — C50.512 CARCINOMA OF LOWER-OUTER QUADRANT OF LEFT BREAST IN FEMALE, ESTROGEN RECEPTOR POSITIVE (HCC): Primary | ICD-10-CM

## 2021-08-23 DIAGNOSIS — Z12.31 BREAST CANCER SCREENING BY MAMMOGRAM: ICD-10-CM

## 2021-08-23 PROCEDURE — 99213 OFFICE O/P EST LOW 20 MIN: CPT | Performed by: SURGERY

## 2021-08-23 PROCEDURE — G8427 DOCREV CUR MEDS BY ELIG CLIN: HCPCS | Performed by: SURGERY

## 2021-08-23 PROCEDURE — G8420 CALC BMI NORM PARAMETERS: HCPCS | Performed by: SURGERY

## 2021-08-23 PROCEDURE — 1036F TOBACCO NON-USER: CPT | Performed by: SURGERY

## 2021-08-23 RX ORDER — IBUPROFEN 200 MG
1 TABLET ORAL EVERY 6 HOURS PRN
COMMUNITY

## 2021-08-23 NOTE — PROGRESS NOTES
Reason for today's visit:  6 month follow up, right nipple drainage that has resolved    Palpates a breast change? no  Nipple discharge: yes - Right Breast , was thick and white, has resolved  Breast Pain: yes - Left Lateral breast at times, feels tight with movement at times  Breast Mass: no  Swelling in either upper extremity? no    Wellness:    Self breast self exams: yes, started recently   Regular exercise routine: yes , on and off,  Works in housekeeping, very active  Following Lymphedema awareness/precautions: yes  Managing stress:yes   Stress level on a scale of 1 - 10: 10, seeing a counselor, has anxiety, has an appointment in 2 weeks and is going to try Zoloft    Instruction:    Follow up per provider  Imaging per provider  Monthly self breast exams  Healthy diet  Exercise program  Lymphedema precautions/awareness    Other:    Requisitions needed:no  Bras/prosthesis: no  Compression Sleeve/glove: no  Lymphedema Measurements: see flow sheet  Therapy: no  PT/OT/Lymphedema: no  Follow up as advised per Dr Lisa Ulloa

## 2021-08-23 NOTE — PROGRESS NOTES
PATIENT:    Anthony Emerson     DATE:      8/23/2021     TIME: 10:57 AM     Subjective:     Anthony Emerson presents for follow-up of breast cancer. She is stressed but is seeking help and is on medications  She no longer has any discharge. It was white but none today    The breast cancer is Cancer Staging  Carcinoma of lower-outer quadrant of left breast in female, estrogen receptor positive ([address]  Staging form: Breast, AJCC 8th Edition  - Clinical: Stage IA (cT1, cN0, cM0, G2, ER+, CT+, HER2-) - Signed by Karan Morley MD on 8/21/2019       Patient's medications, allergies, past medical, surgical, social and family histories were reviewed and updated as appropriate. Current Outpatient Medications on File Prior to Visit   Medication Sig Dispense Refill    ibuprofen (ADVIL;MOTRIN) 200 MG tablet Take 1 tablet by mouth every 6 hours as needed      anastrozole (ARIMIDEX) 1 MG tablet Take 1 mg by mouth every other day       Saccharomyces boulardii (PROBIOTIC) 250 MG CAPS Take 1 capsule by mouth daily      docusate sodium (COLACE) 100 MG capsule Take 100 mg by mouth daily as needed for Constipation      hydrocortisone (ANUSOL-HC) 25 MG suppository UNWRAP AND INSERT 1 SUPPOSITORIES RECTALLY TWICE A DAY AS NEEDED  0    ketoconazole (NIZORAL) 2 % shampoo USE UTD BID  5    Multiple Vitamins-Minerals (MULTIVITAMIN PO) Take by mouth      Cholecalciferol (VITAMIN D3) 2000 units CAPS Take 4,000 Units by mouth       No current facility-administered medications on file prior to visit. Any over the counter meds / herbal supplements / vitamins: yes       Objective:     Vitals:    08/23/21 1005   BP: 118/71   Pulse: 82   Resp: 16   Temp: 97.3 °F (36.3 °C)        Physical Exam  Vitals reviewed. Constitutional:       Appearance: Normal appearance. She is well-developed. HENT:      Head: Normocephalic and atraumatic.       Nose: Nose normal.   Eyes:      Conjunctiva/sclera: Conjunctivae normal.      Right eye: No hemorrhage. Left eye: No hemorrhage. Cardiovascular:      Rate and Rhythm: Normal rate. Pulmonary:      Effort: Pulmonary effort is normal. No respiratory distress. Chest:      Breasts: Breasts are symmetrical.         Right: No inverted nipple, mass, nipple discharge, skin change or tenderness. Left: No inverted nipple, mass, nipple discharge, skin change or tenderness. Musculoskeletal:         General: Normal range of motion. Cervical back: Normal range of motion and neck supple. Comments: Normal Range of motion in upper and lower extremities. Lymphadenopathy:      Cervical: No cervical adenopathy. Right cervical: No superficial, deep or posterior cervical adenopathy. Left cervical: No superficial, deep or posterior cervical adenopathy. Upper Body:      Right upper body: No supraclavicular, axillary or pectoral adenopathy. Left upper body: No supraclavicular, axillary or pectoral adenopathy. Skin:     General: Skin is warm and dry. Findings: No abrasion, bruising, erythema or lesion. Neurological:      Mental Status: She is alert and oriented to person, place, and time. She is not disoriented. Psychiatric:         Speech: Speech normal.         Behavior: Behavior normal. Behavior is cooperative. Thought Content: Thought content normal.         Judgment: Judgment normal.             IMAGING:     See epic     Assessment:       ICD-10-CM    1. Carcinoma of lower-outer quadrant of left breast in female, estrogen receptor positive ([facility]  C50.512     Z17.0          Plan:     Patient is doing well.   Recommend clinical breast exams in the breast surgical suite in 6 month(s)  Mammography in 6 month(s)  Those on Aromatase Inhibitors should follow up with medical oncologist / PCP for monitoring bone health   Recommend an active lifestyle, healthy diet, limited alcohol intake, achieve and maintain a healthy BMI to optimize breast cancer outcomes / decrease risk of breast cancer. Referral to or Follow up with Medical Oncology  Referral to or Follow up with Radiation Oncology  Follow up with PCP  Recommend to follow up with Psychologist / Psychiatrist          Total face to face time was 25 minutes with greater than 50% spent on counseling the patient or coordinating her care. Dictated by Cathy Chacon MD 8/23/2021 10:57 AM       This note was partially generated using Dragon voice recognition system, and there may be some incorrect words, spellings, punctuation that were not noticed in checking the note before saving.

## 2021-08-30 ENCOUNTER — HOSPITAL ENCOUNTER (EMERGENCY)
Age: 51
Discharge: LEFT BEFORE DISCHARGE | End: 2021-08-30
Payer: COMMERCIAL

## 2021-08-30 VITALS
HEIGHT: 67 IN | OXYGEN SATURATION: 98 % | RESPIRATION RATE: 18 BRPM | TEMPERATURE: 98 F | HEART RATE: 75 BPM | BODY MASS INDEX: 24.64 KG/M2 | SYSTOLIC BLOOD PRESSURE: 135 MMHG | DIASTOLIC BLOOD PRESSURE: 82 MMHG | WEIGHT: 157 LBS

## 2021-08-31 NOTE — ED TRIAGE NOTES
Pt states she has an area in the back of her throat on the left side that is white and swollen, she denies any pain. Pt is receiving cancer treatment and wants to make certain she is okay.

## 2021-08-31 NOTE — ED NOTES
Pt called the emergency room from her car and advised us that she was in her car and leaving that whatever was stuck in her the back of her throat had gone away and she was to embarrassed to come back in and tell us. Pt advised to come back if this occurs again or if she feels in distress.        Anita Matthews RN  08/30/21 8871

## 2021-11-12 ENCOUNTER — HOSPITAL ENCOUNTER (EMERGENCY)
Age: 51
Discharge: HOME | End: 2021-11-12
Attending: EMERGENCY MEDICINE
Payer: COMMERCIAL

## 2021-11-12 VITALS
OXYGEN SATURATION: 99 % | SYSTOLIC BLOOD PRESSURE: 106 MMHG | TEMPERATURE: 98.3 F | DIASTOLIC BLOOD PRESSURE: 76 MMHG | WEIGHT: 10 LBS | HEIGHT: 66 IN | BODY MASS INDEX: 1.61 KG/M2 | RESPIRATION RATE: 18 BRPM

## 2021-11-12 DIAGNOSIS — J01.00 ACUTE NON-RECURRENT MAXILLARY SINUSITIS: Primary | ICD-10-CM

## 2021-11-12 DIAGNOSIS — H10.021 PINK EYE DISEASE OF RIGHT EYE: ICD-10-CM

## 2021-11-12 PROCEDURE — 99283 EMERGENCY DEPT VISIT LOW MDM: CPT

## 2021-11-12 RX ORDER — AZITHROMYCIN 250 MG/1
TABLET, FILM COATED ORAL
Qty: 1 PACKET | Refills: 0 | Status: SHIPPED | OUTPATIENT
Start: 2021-11-12 | End: 2021-11-16

## 2021-11-12 RX ORDER — POLYMYXIN B SULFATE AND TRIMETHOPRIM 1; 10000 MG/ML; [USP'U]/ML
1 SOLUTION OPHTHALMIC EVERY 4 HOURS
Qty: 1 EACH | Refills: 0 | Status: SHIPPED | OUTPATIENT
Start: 2021-11-12 | End: 2021-11-22

## 2021-11-12 ASSESSMENT — ENCOUNTER SYMPTOMS
COUGH: 1
BACK PAIN: 0
SINUS PRESSURE: 1
DIARRHEA: 0
VOMITING: 0
SHORTNESS OF BREATH: 0
EYE REDNESS: 1
RHINORRHEA: 1
NAUSEA: 0
ABDOMINAL PAIN: 0
EYE DISCHARGE: 1
SORE THROAT: 0

## 2021-11-12 NOTE — ED TRIAGE NOTES
Pt to ed from home via triage with concern for makayla. Pt sates that she has a red eye yesterday, with discharge today, right eye only. Pt reports that she works in a  and is concerned about getting kids sick. Pt states that she has been \"fighting a sinus infection\" for a couple of weeks. Reports sinus congestion and draining.  Pt denies CP, SOB, NVD or fever

## 2021-11-12 NOTE — Clinical Note
Ashok Buerger was seen and treated in our emergency department on 11/12/2021. She may return to work on 11/15/2021. If you have any questions or concerns, please don't hesitate to call.       Bruce Rees MD

## 2021-11-12 NOTE — ED PROVIDER NOTES
[address] [facility] ED  eMERGENCYdEPARTMENT eNCOUnter      Pt Name: Sahil Lopez  MRN: [id number]  [place] 1970  Date of evaluation: 11/12/2021  Anthony Lees MD    CHIEF COMPLAINT           HPI  Sahil Lopez is a 54 y.o. female per chart review has a h/o breast Ca presents to the ED with rhinorrhea, cough, sinus congestion, R eye erythema. Pt notes rhinorrhea, cough, sinus congestion x 3 weeks. Pt notes 2 days ago she noted R conjunctival injection and mild discharge. Pt denies fever, n/v, cp, sob, ab pain, dysuria, diarrhea. Pt states she always gets sinus infections this time of year. ROS  Review of Systems   Constitutional: Negative for activity change, chills and fever. HENT: Positive for rhinorrhea and sinus pressure. Negative for ear pain and sore throat. Eyes: Positive for discharge and redness. Negative for visual disturbance. Respiratory: Positive for cough. Negative for shortness of breath. Cardiovascular: Negative for chest pain, palpitations and leg swelling. Gastrointestinal: Negative for abdominal pain, diarrhea, nausea and vomiting. Genitourinary: Negative for dysuria. Musculoskeletal: Negative for back pain. Skin: Negative for rash. Neurological: Negative for dizziness and weakness. Except as noted above the remainder of the review of systems was reviewed and negative.        PAST MEDICAL HISTORY     Past Medical History:   Diagnosis Date    Abnormal Pap smear of cervix     Breast cancer ([facility] 08/2019    left    Cancer [facility])     breast    History of therapeutic radiation     Irritable bowel          SURGICAL HISTORY       Past Surgical History:   Procedure Laterality Date    BREAST BIOPSY Left 07/29/2019    BREAST BIOPSY Left 8/29/2019    LEFT LUMPECTOMY AND  SENTINEL LYMPH NODE BIOPSY performed by Sofie Best MD at [address] [place] [address], [facility] N/A 12/16/2019    D/C WITH FROZEN SECTION, [facility]SE performed by Eli Gee DO at [address] [facility] [address] LIVER BIOPSY      OVARIAN CYST REMOVAL      Bilat    OVARY REMOVAL      TUBAL LIGATION           CURRENTMEDICATIONS       Previous Medications    ANASTROZOLE (ARIMIDEX) 1 MG TABLET    Take 1 mg by mouth every other day     CHOLECALCIFEROL (VITAMIN D3) 2000 UNITS CAPS    Take 4,000 Units by mouth    DOCUSATE SODIUM (COLACE) 100 MG CAPSULE    Take 100 mg by mouth daily as needed for Constipation    HYDROCORTISONE (ANUSOL-HC) 25 MG SUPPOSITORY    UNWRAP AND INSERT 1 SUPPOSITORIES RECTALLY TWICE A DAY AS NEEDED    IBUPROFEN (ADVIL;MOTRIN) 200 MG TABLET    Take 1 tablet by mouth every 6 hours as needed    KETOCONAZOLE (NIZORAL) 2 % SHAMPOO    USE UTD BID    MULTIPLE VITAMINS-MINERALS (MULTIVITAMIN PO)    Take by mouth    SACCHAROMYCES BOULARDII (PROBIOTIC) 250 MG CAPS    Take 1 capsule by mouth daily       ALLERGIES     Avelox [moxifloxacin], Keflex [cephalexin], and Pseudoephedrine    FAMILY HISTORY       Family History   Problem Relation Age of Onset    Heart Attack Maternal Uncle     Diabetes Maternal Grandfather     Heart Attack Paternal Uncle           SOCIAL HISTORY       Social History     Socioeconomic History    Marital status:      Spouse name: None    Number of children: None    Years of education: None    Highest education level: None   Occupational History    None   Tobacco Use    Smoking status: Former Smoker     Packs/day: 0.50     Years: 30.00     Pack years: 15.00     Quit date: 2019     Years since quittin.5    Smokeless tobacco: Never Used   Vaping Use    Vaping Use: Former    Substances: Never   Substance and Sexual Activity    Alcohol use:  Yes     Alcohol/week: 0.0 - 8.0 standard drinks     Comment: every other weekend    Drug use: Never    Sexual activity: Yes     Partners: Male   Other Topics Concern    None   Social History Narrative    None     Social Determinants of Health     Financial Resource Strain:     Difficulty of Paying Living Expenses: Not on file   Food Insecurity:     Worried About Running Out of Food in the Last Year: Not on file    Katherine of Food in the Last Year: Not on file   Transportation Needs:     Lack of Transportation (Medical): Not on file    Lack of Transportation (Non-Medical): Not on file   Physical Activity:     Days of Exercise per Week: Not on file    Minutes of Exercise per Session: Not on file   Stress:     Feeling of Stress : Not on file   Social Connections:     Frequency of Communication with Friends and Family: Not on file    Frequency of Social Gatherings with Friends and Family: Not on file    Attends Faith Services: Not on file    Active Member of [address] [place] [address] [email or web address] or Organizations: Not on file    Attends Club or Organization Meetings: Not on file    Marital Status: Not on file   Intimate Partner Violence:     Fear of Current or Ex-Partner: Not on file    Emotionally Abused: Not on file    Physically Abused: Not on file    Sexually Abused: Not on file   Housing Stability:     Unable to Pay for Housing in the Last Year: Not on file    Number of [place] in the Last Year: Not on file    Unstable Housing in the Last Year: Not on file         PHYSICAL EXAM       ED Triage Vitals [11/12/21 1019]   BP Temp Temp Source Pulse Resp SpO2 Height Weight   106/76 98.3 °F (36.8 °C) Oral -- 18 99 % 5' 6\" (1.676 m) 10 lb (4.536 kg)       Physical Exam  Vitals and nursing note reviewed. Constitutional:       Appearance: She is well-developed. HENT:      Head: Normocephalic. Comments: +Bilateral maxillary sinus pressure     Right Ear: External ear normal.      Left Ear: External ear normal.   Eyes:      Pupils: Pupils are equal, round, and reactive to light. Comments: R eye conjunctival injection. +Green discharge. OD 20/20, OS 20/20, OU 20/20. Cardiovascular:      Rate and Rhythm: Normal rate and regular rhythm.       Heart sounds: Normal heart sounds. Pulmonary:      Effort: Pulmonary effort is normal.      Breath sounds: Normal breath sounds. Abdominal:      General: Bowel sounds are normal. There is no distension. Palpations: Abdomen is soft. Tenderness: There is no abdominal tenderness. Musculoskeletal:         General: Normal range of motion. Cervical back: Normal range of motion and neck supple. Skin:     General: Skin is warm and dry. Neurological:      Mental Status: She is alert and oriented to person, place, and time. Psychiatric:         Mood and Affect: Mood normal.           MDM  55 yo female presents to the ED with sinusitis and pink eye. Pt is afebrile, hemodynamically stable. Given longevity of rhinorrhea, cough, congestion, will treat pt with abx. Pt also given prescription for polytrim eye drops for pink eye. Suspect sinus infx lead to pink eye via nasolacrimal duct. Pt given prescription for polytrim, azithromycin. Pt given sinusitis warning signs and will f/u with pcp. Pt understands plan. FINAL IMPRESSION      1. Acute non-recurrent maxillary sinusitis    2.  Pink eye disease of right eye          DISPOSITION/PLAN   DISPOSITION Decision To Discharge 11/12/2021 10:29:00 AM        DISCHARGE MEDICATIONS:  [unfilled]         Ike Rader MD(electronically signed)  Attending Emergency Physician            Ike Rader MD  11/12/21 7075

## 2022-01-06 ENCOUNTER — HOSPITAL ENCOUNTER (EMERGENCY)
Age: 52
Discharge: LEFT BEFORE DISCHARGE | End: 2022-01-06
Attending: STUDENT IN AN ORGANIZED HEALTH CARE EDUCATION/TRAINING PROGRAM
Payer: COMMERCIAL

## 2022-01-06 VITALS
SYSTOLIC BLOOD PRESSURE: 139 MMHG | TEMPERATURE: 98.6 F | RESPIRATION RATE: 16 BRPM | HEART RATE: 98 BPM | WEIGHT: 160 LBS | DIASTOLIC BLOOD PRESSURE: 85 MMHG | OXYGEN SATURATION: 97 % | BODY MASS INDEX: 25.11 KG/M2 | HEIGHT: 67 IN

## 2022-01-06 DIAGNOSIS — J06.9 ACUTE UPPER RESPIRATORY INFECTION: Primary | ICD-10-CM

## 2022-01-06 PROCEDURE — 99283 EMERGENCY DEPT VISIT LOW MDM: CPT

## 2022-01-06 ASSESSMENT — ENCOUNTER SYMPTOMS
EYE PAIN: 0
SHORTNESS OF BREATH: 0
CHEST TIGHTNESS: 0
BACK PAIN: 0
RHINORRHEA: 1
SORE THROAT: 1
DIARRHEA: 0
NAUSEA: 0

## 2022-01-06 NOTE — ED NOTES
Pt spoke with Dr. Gricel Ramos and stated that she did not want to covid test, pt states that she was here for a sinus infection and is she does not have covid. After speak with Dr. Gricel Ramos pt became verbally aggressive, raising her voice, speaking is a stronger tone then the providers in the room. Pt states that she is not staying here and grabbed her belongings and walked out of the er. Pt was asked to wait for paperwork and did not stop and continued to leave.       Saravanan Rosario RN  01/06/22 9516

## 2022-01-06 NOTE — ED PROVIDER NOTES
[address] [facility] ED  eMERGENCYdEPARTMENT eNCOUnter      Pt Name: Daren Durant  MRN: [id number]  [place] 1970  Date of evaluation: 1/6/2022  Provider:Reagan Cai PA-C    CHIEF COMPLAINT           HPI  Daren Durant is a 54 y.o. female per chart review has a h/o sinusitis presents with congestion and sore throat. Pt states she has mild, green/yellow purulent draining from the nose for the past 4 days. She states she has a history of sinus infections. She denies fever, chills, SOB, n/v/d.     ROS  Review of Systems   Constitutional: Negative for chills, fatigue and fever. HENT: Positive for congestion, rhinorrhea and sore throat. Negative for ear pain and hearing loss. Eyes: Negative for pain and visual disturbance. Respiratory: Negative for chest tightness and shortness of breath. Cardiovascular: Negative for chest pain. Gastrointestinal: Negative for diarrhea and nausea. Endocrine: Negative for cold intolerance. Genitourinary: Negative for hematuria. Musculoskeletal: Negative for back pain. Skin: Negative for rash and wound. Neurological: Negative for dizziness and headaches. Psychiatric/Behavioral: Negative for behavioral problems and confusion. Except as noted above the remainder of the review of systems was reviewed and negative.        PAST MEDICAL HISTORY     Past Medical History:   Diagnosis Date    Abnormal Pap smear of cervix     Breast cancer ([facility] 08/2019    left    Cancer [facility])     breast    History of therapeutic radiation     Irritable bowel          SURGICAL HISTORY       Past Surgical History:   Procedure Laterality Date    BREAST BIOPSY Left 07/29/2019    BREAST BIOPSY Left 8/29/2019    LEFT LUMPECTOMY AND  SENTINEL LYMPH NODE BIOPSY performed by Modesto Ramsey MD at [address] [place] [address] 12/16/2019    D/C WITH FROZEN SECTION, [facility] performed by Yu Odell DO at [address] [place] [address] LAPAROSCOPY      LIVER BIOPSY      OVARIAN CYST REMOVAL      Bilat    OVARY REMOVAL      TUBAL LIGATION           CURRENTMEDICATIONS       Previous Medications    ANASTROZOLE (ARIMIDEX) 1 MG TABLET    Take 1 mg by mouth every other day     CHOLECALCIFEROL (VITAMIN D3) 2000 UNITS CAPS    Take 4,000 Units by mouth    DOCUSATE SODIUM (COLACE) 100 MG CAPSULE    Take 100 mg by mouth daily as needed for Constipation    HYDROCORTISONE (ANUSOL-HC) 25 MG SUPPOSITORY    UNWRAP AND INSERT 1 SUPPOSITORIES RECTALLY TWICE A DAY AS NEEDED    IBUPROFEN (ADVIL;MOTRIN) 200 MG TABLET    Take 1 tablet by mouth every 6 hours as needed    KETOCONAZOLE (NIZORAL) 2 % SHAMPOO    USE UTD BID    MULTIPLE VITAMINS-MINERALS (MULTIVITAMIN PO)    Take by mouth    SACCHAROMYCES BOULARDII (PROBIOTIC) 250 MG CAPS    Take 1 capsule by mouth daily       ALLERGIES     Avelox [moxifloxacin], Keflex [cephalexin], and Pseudoephedrine    FAMILY HISTORY       Family History   Problem Relation Age of Onset    Heart Attack Maternal Uncle     Diabetes Maternal Grandfather     Heart Attack Paternal Uncle           SOCIAL HISTORY       Social History     Socioeconomic History    Marital status:      Spouse name: None    Number of children: None    Years of education: None    Highest education level: None   Occupational History    None   Tobacco Use    Smoking status: Former Smoker     Packs/day: 0.50     Years: 30.00     Pack years: 15.00     Quit date: 2019     Years since quittin.7    Smokeless tobacco: Never Used   Vaping Use    Vaping Use: Former    Substances: Never   Substance and Sexual Activity    Alcohol use:  Yes     Alcohol/week: 0.0 - 8.0 standard drinks     Comment: every other weekend    Drug use: Never    Sexual activity: Yes     Partners: Male   Other Topics Concern    None   Social History Narrative    None     Social Determinants of Health     Financial Resource Strain:     Difficulty of Paying Living Expenses: Not on file   Food Insecurity:     Worried About Running Out of Food in the Last Year: Not on file    Katherine of Food in the Last Year: Not on file   Transportation Needs:     Lack of Transportation (Medical): Not on file    Lack of Transportation (Non-Medical): Not on file   Physical Activity:     Days of Exercise per Week: Not on file    Minutes of Exercise per Session: Not on file   Stress:     Feeling of Stress : Not on file   Social Connections:     Frequency of Communication with Friends and Family: Not on file    Frequency of Social Gatherings with Friends and Family: Not on file    Attends [employer] Services: Not on file    Active Member of [address] [place] [address] [employer] or Organizations: Not on file    Attends Club or Organization Meetings: Not on file    Marital Status: Not on file   Intimate Partner Violence:     Fear of Current or Ex-Partner: Not on file    Emotionally Abused: Not on file    Physically Abused: Not on file    Sexually Abused: Not on file   Housing Stability:     Unable to Pay for Housing in the Last Year: Not on file    Number of [place] in the Last Year: Not on file    Unstable Housing in the Last Year: Not on file         PHYSICAL EXAM       ED Triage Vitals [01/06/22 0943]   BP Temp Temp Source Pulse Resp SpO2 Height Weight   139/85 98.6 °F (37 °C) Oral 98 16 97 % 5' 7\" (1.702 m) 160 lb (72.6 kg)       Physical Exam  Constitutional:       Appearance: Normal appearance. HENT:      Head: Normocephalic and atraumatic. Right Ear: External ear normal.      Left Ear: External ear normal.      Nose: Nose normal.      Mouth/Throat:      Mouth: Mucous membranes are moist.   Eyes:      Extraocular Movements: Extraocular movements intact. Conjunctiva/sclera: Conjunctivae normal.   Cardiovascular:      Rate and Rhythm: Normal rate and regular rhythm. Heart sounds: Normal heart sounds.    Pulmonary:      Effort: Pulmonary effort is normal.      Breath sounds: Normal breath sounds. No stridor. No wheezing or rhonchi. Abdominal:      Palpations: Abdomen is soft. Tenderness: There is no abdominal tenderness. Musculoskeletal:         General: Normal range of motion. Cervical back: Normal range of motion and neck supple. No tenderness. Skin:     General: Skin is warm and dry. Neurological:      General: No focal deficit present. Mental Status: She is alert and oriented to person, place, and time. Psychiatric:         Mood and Affect: Mood normal.         Behavior: Behavior normal.           MDM  This is a 54year old female presenting with URI symptoms. Pt is afebrile and HD stable. Pt requested work clearance for contagious symptoms but refuses COVID and flu testing. She requested to be seen by another provider, and was seen by Dr. Lorna Tristan who agreed that without fever, 7 days of purulent drainage, and these symptoms we could not rule out viral illness without testing. Pt left AMA. FINAL IMPRESSION      1.  Acute upper respiratory infection          DISPOSITION/PLAN   DISPOSITION [place] 01/06/2022 10:27:04 AM        DISCHARGE MEDICATIONS:  [unfilled]         Bernardo Willis PA-C(electronically signed)  Attending Emergency Physician           Bernardo Willis PA-C  01/06/22 0224

## 2022-01-06 NOTE — ED NOTES
PA arrived in room and assessed the pt. Pt refused to PA's suggested treatment and asked to have and attending assess her. Dr. Sedrick Yeh was advised.       Ho Grijalva RN  01/06/22 2370

## 2022-01-06 NOTE — ED TRIAGE NOTES
Pa/o x 3 skin pink w/d resp non labored. Pt wants to know if she is contagious. Pt seen by Dr Lesley Sanders for sinus infection. Pt was not told by employer to be checked.

## 2022-02-23 ENCOUNTER — HOSPITAL ENCOUNTER (OUTPATIENT)
Dept: WOMENS IMAGING | Age: 52
Discharge: HOME | End: 2022-02-25
Payer: COMMERCIAL

## 2022-02-23 VITALS — WEIGHT: 160 LBS | HEIGHT: 67 IN | BODY MASS INDEX: 25.11 KG/M2

## 2022-02-23 DIAGNOSIS — Z12.31 BREAST CANCER SCREENING BY MAMMOGRAM: ICD-10-CM

## 2022-02-23 PROCEDURE — 77063 BREAST TOMOSYNTHESIS BI: CPT

## 2022-02-25 ENCOUNTER — OFFICE VISIT (OUTPATIENT)
Dept: ORTHOPEDIC SURGERY | Age: 52
End: 2022-02-25
Payer: COMMERCIAL

## 2022-02-25 VITALS
WEIGHT: 153 LBS | OXYGEN SATURATION: 99 % | HEIGHT: 67 IN | BODY MASS INDEX: 24.01 KG/M2 | HEART RATE: 88 BPM | TEMPERATURE: 98.6 F

## 2022-02-25 DIAGNOSIS — M46.1 SACROILIITIS (HCC): Primary | ICD-10-CM

## 2022-02-25 PROCEDURE — 99213 OFFICE O/P EST LOW 20 MIN: CPT | Performed by: PHYSICIAN ASSISTANT

## 2022-02-25 RX ORDER — LIDOCAINE 50 MG/G
1 PATCH TOPICAL DAILY
Qty: 10 PATCH | Refills: 2 | Status: SHIPPED | OUTPATIENT
Start: 2022-02-25 | End: 2022-03-27

## 2022-02-25 ASSESSMENT — ENCOUNTER SYMPTOMS
EYES NEGATIVE: 1
RESPIRATORY NEGATIVE: 1
GASTROINTESTINAL NEGATIVE: 1

## 2022-02-25 NOTE — PROGRESS NOTES
Cinthya Bateman (:  1970) is a 50 y.o. female,Established patient, here for evaluation of the following chief complaint(s):  New Patient (Right hip pain; pt states this has been gong on for about a week she belives she hurt it while lifting up trash, the next day is when she started feeling the pain she states a few days later she was having pain so bad but was having trouble getting out of the car she says it has happened 2x's after that, she sys she lost mobliity. she said its hard for her to get her leg to go. she states now she hears cracking nosies in her hip. )         ASSESSMENT/PLAN:  1. Sacroiliitis ([facility] [address]  -     Ambulatory referral to Physical Therapy      Return in about 3 weeks (around 3/18/2022). Subjective   SUBJECTIVE/OBJECTIVE:  Patient 53-year-old female complaining of right-sided lower back pain radiating into the lateral aspect of the right hip. She denies any groin pain. States the pain started after a twisting type injury. She is a cancer survivor and is concerned about osteoporosis secondary to her chemotherapy. She has taken ibuprofen sparingly. She denies any weakness of the right leg. Review of Systems   Constitutional: Negative. HENT: Negative. Eyes: Negative. Respiratory: Negative. Gastrointestinal: Negative. Genitourinary: Negative. Musculoskeletal: Negative. Psychiatric/Behavioral: Negative. Objective   Physical Exam  Musculoskeletal:      Comments: Right hip-no groin pain with internal or external rotation of the right leg. No snapping hip. Trochanteric bursa is nontender. .  No pain with resisted abduction or abduction of the right leg. IT band is soft. Sensation is intact distally to light touch. Lower back-no midline spinal tenderness. Tenderness with palpation directly over the right SI joint. Pain increases with rotation to the left greater than rotation to the right. Heel-to-toe ambulation is intact.   Straight leg raise on the right elicits right-sided buttocks pain but no leg pain. Sensation is intact distally to light touch. Explained to the patient that she does have some irritation of her right SI joint. We discussed oral steroids as well as an injection today. She politely declined. She begin using provider Derm patches. She may purchase them over the counter or I did send a prescription to her pharmacy. She can take 600 mg ibuprofen twice daily for the next 5 to 7 days. She begin physical therapy. I believe she would benefit from some core strengthening as well as stretching exercises. She is to follow-up with me in about 3 weeks. On this date 2/25/2022 I have spent 25 minutes reviewing previous notes, test results and face to face with the patient discussing the diagnosis and importance of compliance with the treatment plan as well as documenting on the day of the visit. An electronic signature was used to authenticate this note.     --AGUILAR Wallace

## 2022-03-09 ENCOUNTER — HOSPITAL ENCOUNTER (OUTPATIENT)
Dept: PHYSICAL THERAPY | Age: 52
Setting detail: THERAPIES SERIES
Discharge: HOME | End: 2022-03-09
Payer: COMMERCIAL

## 2022-03-30 ENCOUNTER — HOSPITAL ENCOUNTER (OUTPATIENT)
Dept: PHYSICAL THERAPY | Age: 52
Setting detail: THERAPIES SERIES
Discharge: HOME | End: 2022-03-30
Payer: COMMERCIAL

## 2022-03-30 PROCEDURE — 97161 PT EVAL LOW COMPLEX 20 MIN: CPT

## 2022-03-30 NOTE — PROGRESS NOTES
[address]  PHYSICAL THERAPY EVALUATION    Date: 3/30/2022  Patient Name: Yara Denney       MRN: [id number]   Account: [de-identified]   : 1970  (50 y.o.)   Gender: female   Referring Practitioner: AGUILAR Kaba                 Diagnosis: Sacroiliitis  Treatment Diagnosis: LBP, R hip pain  Additional Pertinent Hx: osteoporosis, cancer (in remission)             Past Medical History:  has a past medical history of Abnormal Pap smear of cervix, BRCA1 negative, BRCA2 negative, Breast cancer ([facility] [address] Cancer ([facility] [address] Endometrial cancer ([facility] [address] History of therapeutic radiation, and Irritable bowel. Past Surgical History:   has a past surgical history that includes laparoscopy; liver biopsy; Tubal ligation; ovarian cyst removal; Breast biopsy (Left, 2019); Breast biopsy (Left, 2019); Breast lumpectomy; Hysterectomy, vaginal (N/A, 2019); Hysterectomy; and Ovary removal.    Vital Signs  Patient Currently in Pain: No   Pain Screening  Patient Currently in Pain: No                Lives With: Spouse  Type of Home: House  Home Layout: Two level; Laundry in basement  ADL Assistance: Independent  Homemaking Assistance: Independent  Ambulation Assistance: Independent  Transfer Assistance: Independent  Active : Yes  Occupation: Part time employment  Type of occupation:          Subjective:  Subjective: Pain in right side of low back. Radiates to R hip and leg. Started mid 2022. Was evaluated by ortho. Pt reports she would like to be evaluated for ongoing pain and provided a couple exercises to begin addressing deficits. Pt states she will be unable to participate in a formal therapy POC d/t busy schedule. Pt reports pain has gradually been improving since onset. Pt currently modifies activity at work to reduce intensity of lift and lifting with rotation. Pt currently reports getting out of the car is also difficult.        Objective: Ambulation 1  Assistance: Independent  Gait Deviations: None  Comments: no significant gait deviations at this time; midline posture, symmetrical step length, no antalgia or trendelenburg displayed             Strength RLE  Strength RLE: WFL  Comment: hip and knee 4+/5 or greater via MMT and functional squat/kneeling assessed  Strength LLE  Strength LLE: WFL  Comment: hip and knee 4+/5 or greater via MMT and functional squat/kneeling assessed        Strength Other  Other: core: 3 to 3+/5, back extensors 3 to 3+/5    PROM RLE (degrees)  RLE PROM: WFL  RLE General PROM: hamstring and hip ER/IR tightness     PROM LLE (degrees)  LLE PROM: WFL  LLE General PROM: hamstring and hip ER/IR tightness       Spine  Lumbar: flexion = WFL (~75%) ; extension = WFL ; left side bend = WFL ; right side bend = WFL ; left rotation = WFL; right rotation = WFL (no pain provocation at this time)    Observation/Palpation  Palpation: no significant pain to muscle palpation in low back or R hip; mild soreness reported  Observation: pt dons back brace for work activity, has brace on today  Body Mechanics: squats to reach to floor, mild trunk flexion display; able to kneel with good opal opal      Exercises:   Exercises  Exercise 1: HS stretch w/ strap  Exercise 2: supine piriformis stretch  Exercise 3: SKTC stretch  Exercise 4: Mod dillon stretch for hip flexs and quad  Exercise 5: bird-dog for spinal extension strength       *Indicates exercise,modality, or manual techniques to be initiated when appropriate  Assessment: Body structures, Functions, Activity limitations: Increased pain  Assessment: Pt is a 49 yo female presenting with R side LBP with mild symptoms radiating to her R glut. Pt reports pain has gradually been improving since initial onset in mid February.  Pt has requested an evaluation only with [facility] to further address ongoing deficits, as she will be unable to participate in a formal therapy POC given her current personal schedule. Upon evaluation, pt displays significant tightness throughout opal hips and leg musculature. Lumbar ROM was [place] without pain provocation and pt also displayed good functional strength via activities assessed. Pt was provided a baseline HEP for general flexibility to decrease low back strain with work activities specified. Pt notified of process required to reestablish a new therapy POC if needs arise. Decision Making: Low Complexity  History: med  Exam: low  Clinical Presentation: stable; low        Plan  Frequency/Duration:  Plan  Plan Comment: Evaluation only; pt requesting HEP only at this time         Patient Education  New Education Provided: PT Education: PT Role;Plan of Care;Home Exercise Program  Patient Education: HEP handouts provided    POST-PAIN     Pain Rating (0-10 pain scale):  0 /10  Location and pain description same as pre-treatment unless indicated. Action: [] NA  [] Call Physician  [x] Perform HEP  [x] Meds as prescribed    Evaluation and patient rights have been reviewed and patient agrees with plan of care. Yes  [x]  No  []   Explain:       Thomas Fall Risk Assessment  Risk Factor Scale  Score   History of Falls [] Yes  [x] No 25  0    Secondary Diagnosis [] Yes  [x] No 15  0    Ambulatory Aid [] Furniture  [] Crutches/cane/walker  [x] None/bedrest/wheelchair/nurse 30  15  0    IV/Heparin Lock [] Yes  [x] No 20  0    Gait/Transferring [] Impaired  [] Weak  [x] Normal/bedrest/immobile 20  10  0    Mental Status [] Forgets limitations  [x] Oriented to own ability 15  0       Total: 0     Based on the Assessment score: check the appropriate box.   [x]  No intervention needed   Low =   Score of 0-24  []  Use standard prevention interventions Moderate =  Score of 24-44   [] Discuss fall prevention strategies   [] Indicate moderate falls risk on eval  []  Use high risk prevention interventions High = Score of 45 and higher   [] Discuss fall prevention strategies   [] Provide supervision during treatment time            PT Individual Minutes  Time In: 0850  Time Out: 0928  Minutes: 38     Procedure Minutes: 33 min eval     Timed Activity Minutes Units   Ther Ex/HEP edu 5 0       Electronically signed by Brandon Alonso PT on 3/30/22 at 9:34 AM EDT

## 2022-03-30 NOTE — PROGRESS NOTES
[address]     Ph: [phone]  Fax: [phone]    [] Certification  [] Recertification []  Plan of Care  [] Progress Note [x] Discharge      To:  AGUILAR Lofton      From:  Jacobo Randolph, PT  Patient: Natalie Crespo     : 1970  Diagnosis: Sacroiliitis     Date: 3/30/2022  Treatment Diagnosis: LBP, R hip pain       Progress Report Period from:  3/30/2022  to 3/30/2022    Total # of Visits to Date: 1   No Show: 0    Canceled Appointment: 0     OBJECTIVE:   Body structures, Functions, Activity limitations: Increased pain  Assessment: Pt is a 49 yo female presenting with R side LBP with mild symptoms radiating to her R glut. Pt reports pain has gradually been improving since initial onset in mid February. No pain to report today. Pt has requested an evaluation only with HEP to further address ongoing deficits, as she will be unable to participate in a formal therapy POC given her current personal schedule. Upon evaluation, pt displays significant tightness throughout opal hips and leg musculature. [facility] [place] pain provocation and pt also displayed good functional strength via activities assessed. Pt was provided a baseline HEP for general flexibility to decrease low back strain with work activities specified. Pt notified of process required to reestablish a new therapy POC if needs arise. PT Education: PT Role;Plan of Care;Home Exercise Program  Patient Education: HEP handouts provided    PLAN: [x] Evaluation only per pt's request  Frequency/Duration:  Plan  Plan Comment: Evaluation only; pt requesting HEP only at this time                     Patient Status:[] Continue/ Initiate plan of Care    [x] Discharge PT. Recommend pt continue with HEP.      [] Additional visits requested, Please re-certify for additional visits:          Signature: Electronically signed by Jacobo Randolph PT

## 2022-04-04 ENCOUNTER — OFFICE VISIT (OUTPATIENT)
Dept: SURGERY | Age: 52
End: 2022-04-04
Payer: COMMERCIAL

## 2022-04-04 VITALS
DIASTOLIC BLOOD PRESSURE: 79 MMHG | HEIGHT: 67 IN | TEMPERATURE: 97.9 F | WEIGHT: 149.4 LBS | RESPIRATION RATE: 18 BRPM | BODY MASS INDEX: 23.45 KG/M2 | OXYGEN SATURATION: 98 % | SYSTOLIC BLOOD PRESSURE: 123 MMHG | HEART RATE: 83 BPM

## 2022-04-04 DIAGNOSIS — Z12.31 SCREENING MAMMOGRAM FOR BREAST CANCER: ICD-10-CM

## 2022-04-04 DIAGNOSIS — Z17.0 CARCINOMA OF LOWER-OUTER QUADRANT OF LEFT BREAST IN FEMALE, ESTROGEN RECEPTOR POSITIVE (HCC): Primary | ICD-10-CM

## 2022-04-04 DIAGNOSIS — Z91.89 AT HIGH RISK FOR BREAST CANCER: ICD-10-CM

## 2022-04-04 DIAGNOSIS — C50.512 CARCINOMA OF LOWER-OUTER QUADRANT OF LEFT BREAST IN FEMALE, ESTROGEN RECEPTOR POSITIVE (HCC): Primary | ICD-10-CM

## 2022-04-04 PROBLEM — M25.569 KNEE PAIN: Status: ACTIVE | Noted: 2020-07-03

## 2022-04-04 PROBLEM — C50.519 MALIGNANT NEOPLASM OF LOWER-OUTER QUADRANT OF FEMALE BREAST (HCC): Status: ACTIVE | Noted: 2019-08-21

## 2022-04-04 PROCEDURE — 3017F COLORECTAL CA SCREEN DOC REV: CPT | Performed by: SURGERY

## 2022-04-04 PROCEDURE — 99214 OFFICE O/P EST MOD 30 MIN: CPT | Performed by: SURGERY

## 2022-04-04 PROCEDURE — G8427 DOCREV CUR MEDS BY ELIG CLIN: HCPCS | Performed by: SURGERY

## 2022-04-04 PROCEDURE — 1036F TOBACCO NON-USER: CPT | Performed by: SURGERY

## 2022-04-04 PROCEDURE — G8420 CALC BMI NORM PARAMETERS: HCPCS | Performed by: SURGERY

## 2022-04-04 RX ORDER — ESTRADIOL 0.1 MG/G
1 CREAM VAGINAL
COMMUNITY
Start: 2021-12-12

## 2022-04-04 RX ORDER — AMMONIUM LACTATE 12 G/100G
1 LOTION TOPICAL DAILY
COMMUNITY
Start: 2021-12-13

## 2022-04-04 NOTE — PATIENT INSTRUCTIONS
Patient Education        Preventing Lymphedema After Treatment for Breast Cancer: Care Instructions  Your Care Instructions     Lymphedema is a buildup of fluid in the soft tissues of the body. It can happen in the arm after breast cancer surgery to remove lymph nodes. If there are few or no lymph nodes, fluid can build up in the arm. It can also happen if the lymph system in an arm has been damaged. Infection, tumors, and scar tissuefrom radiation therapy to the armpit area also can cause fluid to build up. You may be able to avoid lymphedema or keep it under control by following the tips below. Make sure that you take good care of the skin on your arm and hand. Your skin acts as a barrier to keep out bacteria and prevent infection. It is also important not to overuse the muscles in the arm. And don't expose your armto very hot or cold temperatures. Lymphedema can happen soon after breast cancer treatment. Or it may happen many years later. It may affect only part of your arm or hand. In some cases, it affects all of the arm. Make sure to follow these precautions even after you finish treatment. Do not ignore tightness or swelling in or around your arm or hand. You are less likely to have long-term problems if you get these symptomstreated right away. Follow-up care is a key part of your treatment and safety. Be sure to make and go to all appointments, and call your doctor if you are having problems. It's also a good idea to know your test results and keep alist of the medicines you take. How can you care for yourself at home? Skin care     Keep your arm, hand, and armpit clean. Use a mild soap that does not dry out your skin.      Moisturize your skin often.      Take good care of the skin around your fingernails.  Do not bite or cut your cuticles.      Ask your doctor how to handle any cuts, scratches, insect bites, or other injuries you may get.      Use sunscreen and insect repellent outdoors to protect your skin from sunburn and insect bites.      Use an electric razor if you shave your armpit. It's less likely to cut or irritate your skin. Activity     Don't wear clothing or jewelry that is tight on your arm or hand. Your doctor may advise you not to wear a watch or rings on the affected hand.      Wear gloves when you do activities that could hurt the skin on your fingers or hand. Wear them when you garden, do yard work, wash dishes, and clean with chemicals. Use oven mitts when you handle hot food.      Do not have blood drawn from the arm on the side of the lymph node surgery. Do not get injections (shots) or have an IV put in the affected arm.      Do not allow a blood pressure cuff to be placed on that arm. If you are in the hospital, make sure you tell your nurse and other hospital staff about your condition.      Do not expose your arm to very hot or very cold temperatures. For example, don't use hot tubs, saunas, or steam rooms. Don't use a heating pad or cold pack on that arm or shoulder.      Rest your arm often when you do repeated movements, such as vacuum, scrub, or mop.      Try to use your other arm to carry heavy things, such as grocery bags. If you carry a briefcase or a purse, avoid shoulder straps and carry it on your good arm.      Ask your doctor about wearing a compression sleeve and glove (gauntlet). Your doctor may want you to wear these when you exercise or when you fly in an airplane. They can help keep fluid from pooling in your arm and hand. Exercise     Ask your doctor about exercises for your arm and hand. Your doctor may recommend that you see a physical therapist. This person can teach you how to do self-massage to move fluid out of your arm.      Check with your doctor before you start exercises that use the arm. This includes tennis, rowing, or weight lifting. Your doctor can help you find an activity level that's right for you.    When should you call for help?   Call your doctor now or seek immediate medical care if:     You have signs of infection, such as:  ? Increased pain, swelling, warmth, or redness. ? Red streaks leading from the area. ? Pus draining from the area. ? A fever. Watch closely for changes in your health, and be sure to contact your doctor if:     You have new or worse symptoms from lymphedema.      You do not get better as expected. Where can you learn more? Go to https://[employer]or[employer] Governance.StudioTweets. org and sign in to your [employer] account. Enter G546 in the Projectioneering box to learn more about \"Preventing Lymphedema After Treatment for Breast Cancer: Care Instructions. \"     If you do not have an account, please click on the \"Sign Up Now\" link. Current as of: September 8, 2021               Content Version: 13.2  © 5161-6920 Healthwise, Incorporated. Care instructions adapted under license by [facility] ([facility]). If you have questions about a medical condition or this instruction, always ask your healthcare professional. [place] [address] any warranty or liability for your use of this information.

## 2022-04-04 NOTE — PROGRESS NOTES
PATIENT:    Carlo Warner     DATE:      4/4/2022     TIME: 8:50 AM     Subjective:     Carlo Warner presents for follow-up of breast cancer. She has significant stress that she is seeing someone for. She does get left breast pain but is otherwise well. The breast cancer is Cancer Staging  Malignant neoplasm of lower-outer quadrant of female breast [facility])  Staging form: Breast, AJCC 8th Edition  - Clinical: Stage IA (cT1, cN0, cM0, G2, ER+, RI+, HER2-) - Signed by Cecelia Saba MD on 8/21/2019      She does perform self breast exams. She denies  masses, skin changes, nipple discharge, nipple retraction, or recent breast trauma bilaterally. Patient's medications, allergies, past medical, surgical, social and family histories were reviewed and updated as appropriate. Current Outpatient Medications on File Prior to Visit   Medication Sig Dispense Refill    estradiol (ESTRACE) 0.1 MG/GM vaginal cream Place 1 g vaginally Twice a Week      ammonium lactate (LAC-HYDRIN) 12 % lotion Apply 1 Dose topically daily      ibuprofen (ADVIL;MOTRIN) 200 MG tablet Take 1 tablet by mouth every 6 hours as needed      anastrozole (ARIMIDEX) 1 MG tablet Take 1 mg by mouth every other day       Saccharomyces boulardii (PROBIOTIC) 250 MG CAPS Take 1 capsule by mouth daily      docusate sodium (COLACE) 100 MG capsule Take 100 mg by mouth daily as needed for Constipation      hydrocortisone (ANUSOL-HC) 25 MG suppository UNWRAP AND INSERT 1 SUPPOSITORIES RECTALLY TWICE A DAY AS NEEDED  0    ketoconazole (NIZORAL) 2 % shampoo USE UTD BID  5    Multiple Vitamins-Minerals (MULTIVITAMIN PO) Take by mouth      Cholecalciferol (VITAMIN D3) 2000 units CAPS Take 4,000 Units by mouth       No current facility-administered medications on file prior to visit.         Any over the counter meds / herbal supplements / vitamins: yes      Objective:     Vitals:    04/04/22 0833   BP: 123/79   Site: Right Upper Arm   Pulse: 83   Resp: 18   Temp: 97.9 °F (36.6 °C)   SpO2: 98%   Weight: 149 lb 6.4 oz (67.8 kg)   Height: 5' 7\" (1.702 m)        Lymphedema Screening 4/4/2022 8/23/2021 1/25/2021   LEFT 10 cm (Hand) 20.5 19.9 21   LEFT 20 cm (Wrist) 16 16.2 17   LEFT 30 cm (Forearm) 18.5 18.5 24.5   LEFT 60 cm (Upper Arm) 25.5 27 28       Physical Exam  Vitals reviewed. Constitutional:       Appearance: Normal appearance. She is well-developed. HENT:      Head: Normocephalic and atraumatic. Nose: Nose normal.   Eyes:      Conjunctiva/sclera: Conjunctivae normal.      Right eye: No hemorrhage. Left eye: No hemorrhage. Cardiovascular:      Rate and Rhythm: Normal rate. Pulmonary:      Effort: Pulmonary effort is normal. No respiratory distress. Chest:   Breasts: Breasts are symmetrical.      Right: No inverted nipple, mass, nipple discharge, skin change, tenderness, axillary adenopathy or supraclavicular adenopathy. Left: No inverted nipple, mass, nipple discharge, skin change, tenderness, axillary adenopathy or supraclavicular adenopathy. Musculoskeletal:         General: Normal range of motion. Cervical back: Normal range of motion and neck supple. Comments: Normal Range of motion in upper and lower extremities. Lymphadenopathy:      Cervical: No cervical adenopathy. Right cervical: No superficial, deep or posterior cervical adenopathy. Left cervical: No superficial, deep or posterior cervical adenopathy. Upper Body:      Right upper body: No supraclavicular, axillary or pectoral adenopathy. Left upper body: No supraclavicular, axillary or pectoral adenopathy. Skin:     General: Skin is warm and dry. Findings: No abrasion, bruising, erythema or lesion. Neurological:      Mental Status: She is alert and oriented to person, place, and time. She is not disoriented. Psychiatric:         Speech: Speech normal.         Behavior: Behavior normal. Behavior is cooperative.          Thought Content: Thought content normal.         Judgment: Judgment normal.              IMAGING:     See epic     Assessment:       ICD-10-CM    1. Carcinoma of lower-outer quadrant of left breast in female, [facility]  C50.512     Z17.0    2. At high risk for breast cancer  Z91.89    3. Screening mammogram for breast cancer  Z12.31 FIDELINA DIGITAL SCREEN W OR WO CAD BILATERAL         Plan:     Patient is doing well. Recommend clinical breast exams with Jennifer Erazo in the high risk breast clinic in 6 month(s)  Monitor signs of lymphedema  Those on Aromatase Inhibitors should follow up with medical oncologist / PCP for monitoring bone health   Recommend an active lifestyle, healthy diet, limited alcohol intake, achieve and maintain a healthy BMI to optimize breast cancer outcomes / decrease risk of breast cancer. Survivorship Visit with Jennifer Erazo NP  Referral to or Follow up with Medical Oncology  Follow up with PCP regarding all medical problems     Total face to face time was 30 minutes with greater than 50% spent on counseling the patient or coordinating her care. Dictated by Manjeet Kamara MD 4/4/2022 8:50 AM       This note was partially generated using Dragon voice recognition system, and there may be some incorrect words, spellings, punctuation that were not noticed in checking the note before saving.

## 2022-04-04 NOTE — PROGRESS NOTES
Reason for today's visit:  6 month left breast cancer follow up    Palpates a breast change? no  Nipple discharge: no  Breast Pain: yes - inferior left breast,\"by rib\"  Breast Mass: no  Swelling in either upper extremity? no    Wellness:    Self breast self exams: yes   Regular exercise routine: no  Following Lymphedema awareness/precautions: yes   Managing stress:yes   Stress level on a scale of 1 - 10: 10, talks with a counselor    Instruction:    Follow up per provider  Imaging per provider  Monthly self breast exams  Healthy diet  Exercise program  Lymphedema precautions/awareness    Other:    Requisitions needed:no  Bras/prosthesis: no  Compression Sleeve/glove: no  Lymphedema Measurements: see flow sheet  Therapy: no  PT/OT/Lymphedema: no  Follow up as advised per Dr Wooten Clamp

## 2022-05-06 ENCOUNTER — TELEPHONE (OUTPATIENT)
Dept: OBGYN CLINIC | Age: 52
End: 2022-05-06

## 2022-05-06 NOTE — TELEPHONE ENCOUNTER
Received call from patient stating received call from Dr. Colindres Knows office regarding if ok to reschedule appointment with Dr. Li Greene would like to receive a call back.

## 2022-08-22 ENCOUNTER — TELEPHONE (OUTPATIENT)
Dept: OBGYN CLINIC | Age: 52
End: 2022-08-22

## 2022-08-22 NOTE — TELEPHONE ENCOUNTER
The patient was concerned about the timing of the MRI and thought that she may need to have a mammogram first. I reviewed with the patient that she had a screening mammogram of bilateral breasts on 2/23/2022 that was benign, so she is not due for a screening mammogram at this time. I informed her that the Breast MRI is 6 months after the mammogram for high risk patients. The patient is scheduled for her Bilateral Breast MRI on 10/4/2022. The patient verbalized understanding and has no questions at this time.

## 2022-08-22 NOTE — TELEPHONE ENCOUNTER
The patient was concerned about the timing of the MRI and thought that she may need to have a mammogram first. I reviewed with the patient that she had a screening mammogram of bilateral breasts on 2/23/2022 that was benign, so she is not dues for a screening mammogram at this time. I informed her that the Breast MRI is 6 months after the mammogram for high risk patients.  I explained

## 2022-08-22 NOTE — TELEPHONE ENCOUNTER
Received call from patient who stated would like to know if she still has to have the mammogram stating she is scheduled to have an MRI in October and according to insurance they will not cover for the MRI unless she has the mammogram.Patient would like to speak with someone at the office or RN,stated no rush due to she is on vacation all week.

## 2022-10-04 ENCOUNTER — PATIENT MESSAGE (OUTPATIENT)
Dept: SURGERY | Age: 52
End: 2022-10-04

## 2022-10-04 ENCOUNTER — HOSPITAL ENCOUNTER (OUTPATIENT)
Dept: MRI IMAGING | Age: 52
Discharge: HOME | End: 2022-10-06
Payer: COMMERCIAL

## 2022-10-04 DIAGNOSIS — R92.8 ABNORMAL MAGNETIC RESONANCE IMAGING OF RIGHT BREAST: ICD-10-CM

## 2022-10-04 DIAGNOSIS — C50.512 CARCINOMA OF LOWER-OUTER QUADRANT OF LEFT BREAST IN FEMALE, ESTROGEN RECEPTOR POSITIVE (HCC): ICD-10-CM

## 2022-10-04 DIAGNOSIS — Z91.89 AT HIGH RISK FOR BREAST CANCER: ICD-10-CM

## 2022-10-04 DIAGNOSIS — N64.9 BREAST DISORDER: Primary | ICD-10-CM

## 2022-10-04 DIAGNOSIS — Z17.0 CARCINOMA OF LOWER-OUTER QUADRANT OF LEFT BREAST IN FEMALE, ESTROGEN RECEPTOR POSITIVE (HCC): ICD-10-CM

## 2022-10-04 PROCEDURE — A9577 INJ MULTIHANCE: HCPCS | Performed by: SURGERY

## 2022-10-04 PROCEDURE — C8908 MRI W/O FOL W/CONT, BREAST,: HCPCS

## 2022-10-04 PROCEDURE — 6360000004 HC RX CONTRAST MEDICATION: Performed by: SURGERY

## 2022-10-04 RX ORDER — SODIUM CHLORIDE 0.9 % (FLUSH) 0.9 %
10 SYRINGE (ML) INJECTION PRN
Status: DISCONTINUED | OUTPATIENT
Start: 2022-10-04 | End: 2022-10-07 | Stop reason: HOSPADM

## 2022-10-04 RX ADMIN — GADOBENATE DIMEGLUMINE 20 ML: 529 INJECTION, SOLUTION INTRAVENOUS at 10:04

## 2022-10-05 NOTE — TELEPHONE ENCOUNTER
From: Frances Heck  To: Dr. Jones Nielsen: 10/4/2022 11:34 PM EDT  Subject: Maricel Elhamabdelrahman Warner! :) Please forgive me for bothering you. Could you give me an order for an ultrasound? The MRI radiologist wants a 'second look ultrasound.' I remember my first mammogram. Radiologist wanted me to have a biopsy. Dr. Carrie Jorgensen at that time said I did not need one and she would monitor it. I believed her that I didn't need the biopsy. That whole time she monitor me for the 3 years it was in fact cancer and she didn't know it until it went invasive and sucked in my breast skin until I noticed it on my own. The radiologist was correct, not her. I know I'm 100% in great hands with you and I trust you. I just wanted to explain why I feel the need to get the ultrasound done. That situation is regretful for me. I would not be in this situation if I had that biopsy done in the first place. I blame myself for not telling Dr. Carrie Jorgensen that I want the biopsy anyways. I learned its better to be safe than sorry. I see Adrianna Montanez on the 10th. Can you let her know your   decision on this for me? Thank you very very much.  Have a Wonderful Day :) 2018 04:06

## 2022-10-07 NOTE — PROGRESS NOTES
Patient name:           Hossein Christianson  MRN:   [id number]  YOB: 1970  PCP:                           Boyd Swenson DO  Oncologist:                  Teresa Pitts DO  Radiation Onc.:           Kamila Uriarte MD  Surgeon:                     Idania Summers MD    Reason for Visit:   Hossein Christianson presents today for survivorship visit upon completion of treatment for left breast cancer. Cancer Diagnosis/Stage:  Malignant neoplasm of lower-outer quadrant of left breast  Clinical: Stage IA (cT1, cN0, cM0, G2, ER+, GA+, HER2-) - Signed by Idania Summers MD on 8/21/2019    Oncologic Treatment(s):  Surgery, radiation therapy, and hormone blocking therapy    Summary of Case/History:   Hossein Christianson is a very pleasant 50 y.o. y/o female who has a history of breast cancer. She reports that she is doing well post treatment and that she has started a new job at [employer] one year ago and states that she loves working there. Thelma Gilliam had a diagnostic mammogram on 07/24/2019 which resulted in a BIRADS 5 of the left breast. She saw Dr. Evgeny Luna on 8/12/2019 and had a core needle biopsy at this visit. The biospy result confirmed invasive ductal carcinoma of the left breast with staging of IA (cT1, cN0, cM0, G2, ER+, GA+, HER2-). Dr. Evgeny Luna recommended a left lumpectomy and sentinel lymph node biopsy which was performed on 8/28/2019. Thelma Gilliam had genetic testing which was negative. She underwent radiation therapy from 10/10/2019 to 10/31/2019 totaling 4,256 cGy divided into 16 fractions under Dr. Kenney Mansfield. Thelma Gilliam was evaluated by Dr. Jeremi Nolan who recommended Tamoxifen initally, but Thelma Gilliam states she did not want to try, so she has been taking anastrozole. She does have significant arthralgias and she takes the medication every other day so she is able to remain active. Treatment related side effects to date include joint pain, osteoporosis, vaginal dryness and left rib pain.     Past Medical History:    has a past medical history of Abnormal Pap smear of cervix, BRCA1 negative, BRCA2 negative, Breast cancer ([facility] [address] Cancer ([facility] [address] Endometrial cancer ([facility] [address] History of therapeutic radiation, and Irritable bowel. Past Surgical History:    has a past surgical history that includes laparoscopy; liver biopsy; Tubal ligation; ovarian cyst removal; Breast biopsy (Left, 07/29/2019); Breast biopsy (Left, 8/29/2019); Breast lumpectomy; Hysterectomy, vaginal (N/A, 12/16/2019); Hysterectomy; and Ovary removal.    Allergies: Allergies   Allergen Reactions    Avelox [Moxifloxacin]     Keflex [Cephalexin]      GI Upset    Pseudoephedrine         Current Medications: has a current medication list which includes the following prescription(s): estradiol, ammonium lactate, ibuprofen, anastrozole, probiotic, docusate sodium, hydrocortisone, ketoconazole, multiple vitamin, and vitamin d3. Family History:  Family History   Problem Relation Age of Onset    Heart Attack Maternal Uncle     Diabetes Maternal Grandfather     Heart Attack Paternal Uncle        Social History:  Reports that she quit smoking about 3 years ago but has recently (past 6 months) started vaping. She has a 15.00 pack-year smoking history. She has never used smokeless tobacco. She would like to quit vaping but does not want to see Dr. Enrique Atkinson for smoking cessation at this time. She reports current alcohol use. She reports that she does not use drugs. Review of Systems   Constitutional:  Negative for appetite change and fatigue. HENT:  Negative for dental problem and sinus pressure. Eyes:  Negative for pain and itching. Respiratory:  Negative for chest tightness and shortness of breath. Cardiovascular:  Positive for palpitations. Negative for chest pain. Fluttering on and off at night   Gastrointestinal:  Positive for constipation. Negative for diarrhea. Endocrine: Negative for polydipsia and polyphagia. Genitourinary:  Positive for dysuria.  Negative for vaginal bleeding. Musculoskeletal:  Positive for arthralgias. Skin:  Negative for rash and wound. Allergic/Immunologic: Positive for environmental allergies. Negative for food allergies. Neurological:  Negative for dizziness and light-headedness. Hematological:  Negative for adenopathy. Psychiatric/Behavioral:  Positive for sleep disturbance. Negative for confusion. The patient is nervous/anxious. OBJECTIVE:  Vital Signs:/80   Pulse 86   Temp 97.7 °F (36.5 °C)   Resp 16   LMP 10/16/2019   SpO2 96%     Physical Exam  Constitutional:       Appearance: Normal appearance. HENT:      Head: Normocephalic and atraumatic. Right Ear: External ear normal.      Left Ear: External ear normal.      Nose: Nose normal.   Eyes:      Conjunctiva/sclera: Conjunctivae normal.      Right eye: No hemorrhage. Left eye: No hemorrhage. Cardiovascular:      Rate and Rhythm: Normal rate and regular rhythm. Heart sounds: Normal heart sounds. Pulmonary:      Effort: Pulmonary effort is normal.   Chest:   Breasts:     Right: Normal.      Left: Normal.          Comments: Left rib pain post radiation  Abdominal:      General: Abdomen is flat. Musculoskeletal:         General: Normal range of motion. Cervical back: Normal range of motion. Lymphadenopathy:      Head:      Right side of head: No submental adenopathy. Left side of head: No submental adenopathy. Upper Body:      Right upper body: No supraclavicular, axillary or pectoral adenopathy. Left upper body: No supraclavicular, axillary or pectoral adenopathy. Skin:     General: Skin is warm and dry. Neurological:      General: No focal deficit present. Mental Status: She is alert and oriented to person, place, and time. Gait: Gait is intact. Psychiatric:         Mood and Affect: Mood normal.         Behavior: Behavior normal. Behavior is cooperative. Thought Content:  Thought content normal.     DATA Assessment/Plan:    1) Continue with scheduled provider visits, imaging and cancer screenings as ordered. 2) Continue self breast exams and contact provider with any changes. 3) Incorporate healthy lifestyle changes as discussed in visit today. Screenings for other primary cancers in a cancer survivor: According to the [employer] (NCCN), the overall cancer rate in survivors is higher than in the general population. For this reason, it is important to complete screenings according to the NCCN recommendations below:    Breast cancer screening recommendations: The NCCN recommends that all women age 36 and over (at average risk) undergo yearly screening mammograms in addition to yearly clinical breast exams. Last mammogram done 02/23/2022  Next mammogram due 10/31/2022    Cervical cancer screening: The American Cancer Society (ACS) recommends that individuals with a cervix initiate cervical cancer screening at age 22 years and undergo primary human papillomavirus (HPV) testing every 5 years through age 72 years (preferred); if primary HPV testing is not available, then individuals aged 22 to 72 years should be screened with cotesting (HPV testing in combination with cytology) every 5 years or cytology alone every 3 years (acceptable) (strong recommendation). The ACS recommends that individuals aged >65 years who have no history of cervical intraepithelial neoplasia grade 2 or more severe disease within the past 25 years, and who have documented adequate negative prior screening in the prior 10 years, discontinue all cervical cancer screening (qualified recommendation). Colorectal cancer screening:  The NCCN recommends that individuals aged 39 or older (at average risk) should undergo colorectal cancer screening by one of the following screening modalities and schedule:  Colonoscopy: Every 10 years (sooner if history of polyps, other abnormal findings or poor bowel prep) Most recent 2017  Stool-based testing/fecal immunochemical test (FIT): Every year (if positive, will need colonoscopy)  DNA based testing (Cologuard): Every 3 years (if positive, will need colonoscopy)     Lung cancer screening:  The NCCN recommends that high-risk individuals who meet the criteria listed below should undergo yearly lung cancer screening with a low-dose CT yearly :  Age 48 or older  Have a 20 pack-year or more smoking history and:  Currently smoke or  Quit within the past 15 years    Long-term Effects  Start during treatment and persist Late Effects  Start after treatment ends   Surgery Effects   Lack of skin sensitivity  Body image issues  Sexual dysfunction  Numbness  Pain  Limited range of motion  Weakness  Poor cosmetic outcome Lymphedema (swelling in an arm)  Neuropathy (weakness, numbness, pain, often in hands)   Radiation Therapy to the Breast/Chest Wall/Regional Lymph Nodes Effects   Fatigue  Skin sensitivity/pain  Sexual dysfunction  Pain  Pneumonitis (inflammation of lung tissue)  Poor cosmetic outcome  Breast atrophy (decrease in breast volume)/asymmetrical breast volume  Lymphedema  Numbness or weakness of the upper extremity Skin discoloration  Breast may be smaller and firmer than the nonirradiated side (breast asymmetry)  Skin sensitivity/pain  Telangiectasia (spider veins)  Sexual dysfunction  Lymphedema  Shortness of breath (lung pneumonitis or fibrosis)  Cardiovascular disease (e.g., pericardial effusion, pericarditis-inflammation or fluid around the heart)   Numbness or weakness of the upper arm  Second primary cancers (e.g., soft-tissue sarcomas of thorax, shoulder, and pelvis; lung cancer)     Hormonal Therapy Effects   Aromatase Inhibitors  Vaginal dryness  Decreased libido  Musculoskeletal symptoms/pain  Cholesterol elevation Increased risk of osteoporosis  Increased risk of fractures   General Psychological Long-term and Late Effects   Depression  Distress--multifactorial unpleasant experience of psychological, social, and/or spiritual nature  Worry, anxiety  Fear of recurrence  Fear of pain  End-of-life concerns: death and dying  Changes in sexual function and/or desire  Challenges with body image  Challenges with self-image  Relationship and other social role difficulties  [id number] concerns and financial challenges       I discussed the following topics with the patient:    Diet: I reviewed the importance of a plant-based diet for health improvement and education provided on healthy lifestyle promotion and patient verbalized understanding. Exercise: I reviewed the importance of 150 minutes of weekly aerobic exercise and two days of strength-based exercises unless contraindicated by their PCP. I encouraged the patient to start slowly when beginning an exercise routine. Dental Health: Patient is not currently under the care of a dentist. Provided information for [facility] locations and contact information. Smoking cessation: Patient currently is vaping. Lety Mendoza said that she wants to quit and that she had quit smoking cigarettes 3 years ago. She is not interested in smoking cessation assistance at this time. Decreasing alcohol use: Patient currently drinks around drinks every other weekend. Reduce stress:  I reviewed how stress can adversely affect the patient's health and provided tips to reduce stress such as meditation, deep breathing, counseling, and gentle yoga. Lety Mendoza said that she sees a counselor regularly and prays. Sexual issues: Patient stated that she is having sexual issues from treatment for her cancer. Lety Mendoza is not interested in a referral to treat sexual dysfunction at this time. She reports she will call if she decides she would like to see someone.      Relational issues: I discussed the impact of cancer and its' treatment on relationships with others and the patient stated her illness has impacted relationships positively in that her relationship with her  has improved. Depression and distress: I reviewed the PHQ-2 depression screening question with the patient and discussed her result with her. Vinicius Doyle said that she normally feels some depression and feelings of being overwhelmed in life. She has zoloft at home but has not yet taken it as she fears potential side effects. Aishwarya and spirituality: Patient denies concerns of aishwarya and/or spirituality related to her cancer diagnosis and/or cancer treatment. Psychosocial activities: I explained the importance on emotional, physical and mental health of engaging socially and I encouraged the patient to spend time with supportive friends and/or family and to play games or journal to help manage stress. Vinicius Doyle and her  enjoy karoke at home on weekends. They also play corn Kap[employer] together and board games. Follow up visits with oncology specialist(s) and PCP: I educated the patient on the importance of continuing regular care with her PCP, including continued screenings for cancer and other illness. I explained to the patient that she will need to continue to see her oncology specialist(s) as scheduled for continued health maintenance and/or treatment and to continue with any currently ordered therapy treatments. Financial concerns: Patient denies concerns at this time. Patient states she does perform regular self-breast exams. She states she has not noticed any new abnormal lumps since her last provider visit. I encouraged the patient to perform regular breast exams and to report any changes immediately. Patient has determined an achievable health goal in one year is to reduce sugar intake, and quit vaping. Patient verbalizes understanding of the education stated above in the visit today. Patient states she has no concerns today and promises to call myself or her oncologist if a concern or need arises.  I reviewed the National [employer]'s publication Facing Forward: Life After Cancer with the patient and provided it to the patient.  reviewed the [employer] of virtual activities for cancer patients and caregivers with patient today. Patient was given a copy of their Survivorship Care Treatment Plan at visit today. Electronically signed by UZAIR Medina CNP          I spent 60 minutes face to face with patient and greater than 50% of time was spent on survivorship education .

## 2022-10-10 ENCOUNTER — SOCIAL WORK (OUTPATIENT)
Dept: RADIATION ONCOLOGY | Age: 52
End: 2022-10-10

## 2022-10-10 ENCOUNTER — OFFICE VISIT (OUTPATIENT)
Dept: RADIATION ONCOLOGY | Age: 52
End: 2022-10-10
Payer: COMMERCIAL

## 2022-10-10 VITALS
TEMPERATURE: 97.7 F | SYSTOLIC BLOOD PRESSURE: 118 MMHG | DIASTOLIC BLOOD PRESSURE: 80 MMHG | OXYGEN SATURATION: 96 % | RESPIRATION RATE: 16 BRPM | HEART RATE: 86 BPM

## 2022-10-10 DIAGNOSIS — Z91.89 AT HIGH RISK FOR BREAST CANCER: ICD-10-CM

## 2022-10-10 DIAGNOSIS — Z17.0 CARCINOMA OF LOWER-OUTER QUADRANT OF LEFT BREAST IN FEMALE, ESTROGEN RECEPTOR POSITIVE (HCC): Primary | ICD-10-CM

## 2022-10-10 DIAGNOSIS — C50.512 CARCINOMA OF LOWER-OUTER QUADRANT OF LEFT BREAST IN FEMALE, ESTROGEN RECEPTOR POSITIVE (HCC): Primary | ICD-10-CM

## 2022-10-10 PROCEDURE — G8484 FLU IMMUNIZE NO ADMIN: HCPCS

## 2022-10-10 PROCEDURE — 99417 PROLNG OP E/M EACH 15 MIN: CPT

## 2022-10-10 PROCEDURE — 99215 OFFICE O/P EST HI 40 MIN: CPT

## 2022-10-10 PROCEDURE — 3017F COLORECTAL CA SCREEN DOC REV: CPT

## 2022-10-10 PROCEDURE — G8420 CALC BMI NORM PARAMETERS: HCPCS

## 2022-10-10 PROCEDURE — G8428 CUR MEDS NOT DOCUMENT: HCPCS

## 2022-10-10 PROCEDURE — 1036F TOBACCO NON-USER: CPT

## 2022-10-10 ASSESSMENT — ENCOUNTER SYMPTOMS
CONSTIPATION: 1
CHEST TIGHTNESS: 0
SHORTNESS OF BREATH: 0
SINUS PRESSURE: 0
EYE PAIN: 0
EYE ITCHING: 0
DIARRHEA: 0

## 2022-10-10 NOTE — PROGRESS NOTES
SW visit with Pt prior to her Survivorship consult with NP Remy Echevarria. SW reiterated the continued SW support available to her. Pt may contact SW in near future to complete A.D.'s. Pt states no current support needs; recently began full-time employment and is very content with new employer/position. SUSANA provided The [facility] virtual program schedule. No further needs at this time.

## 2022-10-31 ENCOUNTER — HOSPITAL ENCOUNTER (OUTPATIENT)
Dept: ULTRASOUND IMAGING | Age: 52
Discharge: HOME | End: 2022-11-02
Payer: COMMERCIAL

## 2022-10-31 ENCOUNTER — HOSPITAL ENCOUNTER (OUTPATIENT)
Dept: WOMENS IMAGING | Age: 52
Discharge: HOME | End: 2022-11-02
Payer: COMMERCIAL

## 2022-10-31 DIAGNOSIS — R92.8 ABNORMAL MAGNETIC RESONANCE IMAGING OF RIGHT BREAST: ICD-10-CM

## 2022-10-31 DIAGNOSIS — N64.9 BREAST DISORDER: ICD-10-CM

## 2022-10-31 PROCEDURE — 76642 ULTRASOUND BREAST LIMITED: CPT

## 2022-10-31 PROCEDURE — G0279 TOMOSYNTHESIS, MAMMO: HCPCS

## 2022-11-01 ENCOUNTER — TELEPHONE (OUTPATIENT)
Dept: GASTROENTEROLOGY | Age: 52
End: 2022-11-01

## 2022-11-01 NOTE — TELEPHONE ENCOUNTER
----- Message from Sumeet Ward MD sent at 10/31/2022 10:41 AM EDT -----  She needs appt with kaylie for follow up  ----- Message -----  From: Desire Rodriguez Incoming Radiant Results From HQ plus/Pacs  Sent: 10/31/2022   9:18 AM EDT  To: Sumeet Ward MD

## 2022-11-03 NOTE — TELEPHONE ENCOUNTER
I scheduled the patient for her 6 month follow up appointment. The patient is aware of the date/time/location of the appointment. The patient has no questions or concerns at this time.

## 2022-12-27 ENCOUNTER — TELEPHONE (OUTPATIENT)
Dept: OBGYN CLINIC | Age: 52
End: 2022-12-27

## 2023-03-30 ENCOUNTER — HOSPITAL ENCOUNTER (OUTPATIENT)
Dept: WOMENS IMAGING | Age: 53
Discharge: HOME | End: 2023-04-01
Payer: COMMERCIAL

## 2023-03-30 DIAGNOSIS — Z12.31 SCREENING MAMMOGRAM FOR BREAST CANCER: ICD-10-CM

## 2023-03-30 PROCEDURE — 77063 BREAST TOMOSYNTHESIS BI: CPT

## 2023-04-04 ENCOUNTER — OFFICE VISIT (OUTPATIENT)
Dept: RADIATION ONCOLOGY | Age: 53
End: 2023-04-04
Payer: COMMERCIAL

## 2023-04-04 VITALS
WEIGHT: 161.4 LBS | TEMPERATURE: 97.2 F | RESPIRATION RATE: 16 BRPM | HEART RATE: 78 BPM | SYSTOLIC BLOOD PRESSURE: 124 MMHG | DIASTOLIC BLOOD PRESSURE: 75 MMHG | OXYGEN SATURATION: 98 % | BODY MASS INDEX: 25.28 KG/M2

## 2023-04-04 DIAGNOSIS — Z12.31 BREAST CANCER SCREENING BY MAMMOGRAM: Primary | ICD-10-CM

## 2023-04-04 DIAGNOSIS — Z91.89 AT HIGH RISK FOR BREAST CANCER: ICD-10-CM

## 2023-04-04 PROCEDURE — 3017F COLORECTAL CA SCREEN DOC REV: CPT

## 2023-04-04 PROCEDURE — G8427 DOCREV CUR MEDS BY ELIG CLIN: HCPCS

## 2023-04-04 PROCEDURE — 99214 OFFICE O/P EST MOD 30 MIN: CPT

## 2023-04-04 PROCEDURE — 1036F TOBACCO NON-USER: CPT

## 2023-04-04 PROCEDURE — G8419 CALC BMI OUT NRM PARAM NOF/U: HCPCS

## 2023-04-04 ASSESSMENT — ENCOUNTER SYMPTOMS
DIARRHEA: 0
COUGH: 0
SINUS PAIN: 0
SINUS PRESSURE: 0
CHEST TIGHTNESS: 0
SHORTNESS OF BREATH: 0
CONSTIPATION: 1
BLOOD IN STOOL: 0

## 2023-04-04 NOTE — PROGRESS NOTES
10 cm (Hand) 20.5 19.9 21   LEFT 20 cm (Wrist) 16 16.2 17   LEFT 30 cm (Forearm) 18.5 18.5 24.5   LEFT 60 cm (Upper Arm) 25.5 27 28       Physical Exam  Constitutional:       Appearance: Normal appearance. HENT:      Head: Normocephalic and atraumatic. Eyes:      Conjunctiva/sclera: Conjunctivae normal.      Right eye: No hemorrhage. Left eye: No hemorrhage. Pulmonary:      Effort: Pulmonary effort is normal.   Chest:      Chest wall: No mass, lacerations, deformity, swelling, tenderness or edema. Breasts:     Breasts are symmetrical.      Right: Normal.      Left: Normal.   Abdominal:      General: Abdomen is flat. Musculoskeletal:         General: Normal range of motion. Cervical back: Normal range of motion. Lymphadenopathy:      Cervical: No cervical adenopathy. Upper Body:      Right upper body: No supraclavicular, axillary or pectoral adenopathy. Left upper body: No supraclavicular, axillary or pectoral adenopathy. Skin:     General: Skin is warm and dry. Neurological:      General: No focal deficit present. Mental Status: She is alert and oriented to person, place, and time. Gait: Gait is intact. Psychiatric:         Mood and Affect: Mood normal.         Behavior: Behavior normal. Behavior is cooperative. Earnesteen Smoker is a 48y.o.  year old pleasant   female in no apparent distress. The patient was examined in the seated and supine position with the motion of the pectoralis muscles bilaterally. There are no masses visualized in the neck. There is no cervical, supraclavicular or axillary lymphadenopathy noted bilaterally. Bilateral breasts are symmetrical. Bilateral nipples are everted. No dimpling, indentation, nipple discharge or nipple retraction noted bilaterally. After detailed breast examination no palpable abnormalities or concerning thickening are noted bilaterally.   She is alert, oriented, normal speech, no focal findings or

## 2023-04-07 ENCOUNTER — HOSPITAL ENCOUNTER (OUTPATIENT)
Dept: WOMENS IMAGING | Age: 53
End: 2023-04-07
Payer: COMMERCIAL

## 2023-04-07 DIAGNOSIS — Z78.0 MENOPAUSE: ICD-10-CM

## 2023-04-07 PROCEDURE — 77080 DXA BONE DENSITY AXIAL: CPT

## 2023-04-26 NOTE — CARE COORDINATION
CHIEF COMPLAINT:  Chief Complaint   Patient presents with   • Office Visit       HPI:  Mrs. Dedra Black is a pleasant 38 year old  female, who is here today for urgent care follow-up.    Patient went to urgent care with complain of sore throat.  She was negative for strep throat and viral illnesses.  TSH was checked at the same time, was low.  Patient advised to follow-up with PCP.    She skipped the dose of levothyroxine this morning.  She continues with diarrhea, episodes of tachycardia, and anxiety.  She brings a log of symptoms.  Patient reports that every morning when she has the feeling that she will have diarrhea, that is when she has anxiety.  Still with rash on the back.    Her  told her that she snores.  Patient reports that her mother was diagnosed with sleep apnea.       OTHER SIGNIFICANT PROBLEMS:  I have reviewed the patient's medications and allergies, past medical, surgical, social and family history, updating these as appropriate. See Histories section of the EMR for a display of this information.      REVIEW OF SYSTEMS:  ROS  Negative except for as above.    PHYSICAL EXAM:  Physical Exam  Vital Signs:   Visit Vitals  /68 (BP Location: RUE - Right upper extremity, Patient Position: Sitting, Cuff Size: Large Adult)   Pulse (!) 111   Resp 14   Ht 5' 6\" (1.676 m)   Wt 91.4 kg (201 lb 8 oz)   LMP 07/28/2012 Comment: currently not getting menstrual cycles, had a leep surgery two weeks ago   SpO2 99%   BMI 32.52 kg/m²     General: No acute distress, pleasant female  HEENT: Extraocular movements intact, pupils equal, round, reactive to light, no scleral icterus or conjunctival pallor.  Neck: Trachea midline, supple, no thyromegaly, no cervical or supraclavicular lymphadenopathy.  Musculoskeletal: Range of motion and motor strength grossly normal, no joint erythema or swelling.  Skin: No rashes, jaundice or other lesions.  Neurologic: Cranial nerves 2-12 grossly intact, no  Telephone call to patient. Left detailed message explaining that [address] Unemployment Assistance is for people who lost their employment due to [place] [employer]. Explained in message this program may not be of assistance to her. Requested call back number . Call back number was provided. gross motor or sensory deficits  Psych: Affect and mood appropriate.      LABS:  Lab Services on 04/25/2023   Component Date Value   • TSH 04/25/2023 0.261 (L)    Lab Services on 04/24/2023   Component Date Value   • TSH 04/24/2023 0.268 (L)    Walk In on 04/24/2023   Component Date Value   • Rapid SARS-COV-2 by PCR 04/24/2023 Not Detected    • Influenza A by PCR 04/24/2023 Not Detected    • Influenza B by PCR 04/24/2023 Not Detected    • Isolation Guidelines 04/24/2023     • Procedural Comment 04/24/2023     • STREPTOCOCCUS GROUP A PCR 04/24/2023 Not Detected        CT ABDOMEN PELVIS W CONTRAST w/ IV contrast only    Result Date: 4/10/2023  Narrative: EXAM: CT ABDOMEN PELVIS W CONTRAST CLINICAL HISTORY: Diarrhea TECHNIQUE: Helical imaging through the abdomen and pelvis was performed following 100 mL of Omnipaque 300 intravenous contrast. COMPARISON: August 3, 2022. FINDINGS: The liver and spleen are of normal size, contour and attenuation. The gallbladder is free of calcified gallstones. The pancreas and adrenal glands are normal. The kidneys are normal. Abdominal bowel loops are unremarkable. There is no mass, fluid collection or inflammatory process in the abdomen. Pelvis: The bladder is normal. Pelvic bowel loops are unremarkable. No mass, fluid collection or inflammation is identified in the pelvis.     Impression: IMPRESSION: No evidence of acute abnormality in the abdomen or pelvis.        ASSESSMENT AND PLAN:    Dedra Black in the office today for urgent care follow-up.      TSH was checked she was urgent care, was low.  Will repeat TSH again.  Will also add cortisol level and FRANKLYN panel.    Referred to Sleep Medicine.    1. Snoring  - SERVICE TO SLEEP MEDICINE    2. Lightheadedness  - Cortisol, Free; Future    3. Tachycardia  - Cortisol, Free; Future    4. Hypothyroidism, unspecified type  - Thyroid Stimulating Hormone; Future    5. Rash  - FRANKLYN Screen With Antibody And IFA Reflex;  Future        Patient agrees with above plan and verbalizes understanding.      Return in about 3 weeks (around 5/16/2023).        Portions of this note were dictated and transcribed using a voice-activated software. Appropriate corrections to the document have been made, however, areas of comission, omission, or outright mistakes in wording may still exist.        Dragana Durdevic, MD   Purse String (Intermediate) Text: Given the location of the defect and the characteristics of the surrounding skin a pursestring intermediate closure was deemed most appropriate.  Undermining was performed circumfirentially around the surgical defect.  A purstring suture was then placed and tightened.

## 2023-08-04 ENCOUNTER — TELEPHONE (OUTPATIENT)
Dept: WOMENS IMAGING | Age: 53
End: 2023-08-04

## 2023-08-04 NOTE — TELEPHONE ENCOUNTER
08/04/223  Message left for patient to please call back regarding scheduling her MRI of the breast ordered by Dr. Maria Isabel Lin. Juanito Romero.

## 2023-08-14 ENCOUNTER — TELEPHONE (OUTPATIENT)
Dept: WOMENS IMAGING | Age: 53
End: 2023-08-14

## 2023-08-14 DIAGNOSIS — Z91.89 AT HIGH RISK FOR BREAST CANCER: Primary | ICD-10-CM

## 2023-08-14 NOTE — TELEPHONE ENCOUNTER
08/14/23  Spoke with patient and we scheduled her for her MRI of the breast on Oct 13 to arrive at 900 for 0930 appointment. Reviewed process of MRI and what to expect. Encouraged to call with any questions or concerns.

## 2023-10-13 ENCOUNTER — HOSPITAL ENCOUNTER (OUTPATIENT)
Dept: MRI IMAGING | Age: 53
End: 2023-10-13
Attending: SURGERY
Payer: COMMERCIAL

## 2023-10-13 DIAGNOSIS — Z91.89 AT HIGH RISK FOR BREAST CANCER: ICD-10-CM

## 2023-10-13 PROCEDURE — A9577 INJ MULTIHANCE: HCPCS | Performed by: SURGERY

## 2023-10-13 PROCEDURE — 6360000004 HC RX CONTRAST MEDICATION: Performed by: SURGERY

## 2023-10-13 PROCEDURE — C8908 MRI W/O FOL W/CONT, BREAST,: HCPCS

## 2023-10-13 RX ADMIN — GADOBENATE DIMEGLUMINE 20 ML: 529 INJECTION, SOLUTION INTRAVENOUS at 10:00

## 2024-02-05 ENCOUNTER — OFFICE VISIT (OUTPATIENT)
Dept: SURGERY | Age: 54
End: 2024-02-05
Payer: COMMERCIAL

## 2024-02-05 VITALS
HEIGHT: 67 IN | TEMPERATURE: 98.7 F | WEIGHT: 162.6 LBS | OXYGEN SATURATION: 98 % | HEART RATE: 78 BPM | RESPIRATION RATE: 16 BRPM | BODY MASS INDEX: 25.52 KG/M2

## 2024-02-05 DIAGNOSIS — Z91.89 AT HIGH RISK FOR BREAST CANCER: ICD-10-CM

## 2024-02-05 DIAGNOSIS — C50.512 CARCINOMA OF LOWER-OUTER QUADRANT OF LEFT BREAST IN FEMALE, ESTROGEN RECEPTOR POSITIVE (HCC): Primary | ICD-10-CM

## 2024-02-05 DIAGNOSIS — E66.3 OVERWEIGHT (BMI 25.0-29.9): ICD-10-CM

## 2024-02-05 DIAGNOSIS — Z12.31 SCREENING MAMMOGRAM FOR BREAST CANCER: ICD-10-CM

## 2024-02-05 DIAGNOSIS — Z17.0 CARCINOMA OF LOWER-OUTER QUADRANT OF LEFT BREAST IN FEMALE, ESTROGEN RECEPTOR POSITIVE (HCC): Primary | ICD-10-CM

## 2024-02-05 PROCEDURE — G8484 FLU IMMUNIZE NO ADMIN: HCPCS | Performed by: SURGERY

## 2024-02-05 PROCEDURE — 1036F TOBACCO NON-USER: CPT | Performed by: SURGERY

## 2024-02-05 PROCEDURE — 99214 OFFICE O/P EST MOD 30 MIN: CPT | Performed by: SURGERY

## 2024-02-05 PROCEDURE — 3017F COLORECTAL CA SCREEN DOC REV: CPT | Performed by: SURGERY

## 2024-02-05 PROCEDURE — G8419 CALC BMI OUT NRM PARAM NOF/U: HCPCS | Performed by: SURGERY

## 2024-02-05 PROCEDURE — G8427 DOCREV CUR MEDS BY ELIG CLIN: HCPCS | Performed by: SURGERY

## 2024-02-05 RX ORDER — WHEAT DEXTRIN 3 G/3.8 G
4 POWDER (GRAM) ORAL DAILY
COMMUNITY

## 2024-02-05 RX ORDER — TRAZODONE HYDROCHLORIDE 50 MG/1
50 TABLET ORAL NIGHTLY PRN
COMMUNITY
Start: 2024-01-18

## 2024-02-05 RX ORDER — MELOXICAM 15 MG/1
15 TABLET ORAL DAILY PRN
COMMUNITY
Start: 2024-01-30

## 2024-02-05 RX ORDER — LIDOCAINE 50 MG/G
1 PATCH TOPICAL DAILY PRN
COMMUNITY
Start: 2024-01-30

## 2024-02-05 ASSESSMENT — ENCOUNTER SYMPTOMS
CHEST TIGHTNESS: 0
COUGH: 0
VOMITING: 0
SHORTNESS OF BREATH: 0
NAUSEA: 0
ABDOMINAL PAIN: 0
COLOR CHANGE: 0
SORE THROAT: 0

## 2024-02-05 NOTE — PROGRESS NOTES
Patient accepts doctor student to be present for exam  Reason for today's visit:  6 month follow up left breast cancer, continues on Anastrazole    Palpates a breast change? no  Nipple discharge: no  Breast Pain: yes - Under left breast feels tight at times  Breast Mass: no  Swelling in either upper extremity? no    Wellness:    Self breast self exams: no  Regular exercise routine: no but active at work  Following Lymphedema awareness/precautions: yes   Managing stress:yes   Stress level on a scale of 1 - 10: 7, talks with a psychiatrist    Instruction:    Follow up per provider  Imaging per provider  Monthly self breast exams  Healthy diet  Exercise program  Lymphedema precautions/awareness    Other:    Requisitions needed:no  Bras/prosthesis: no  Compression Sleeve/glove: no  Lymphedema Measurements: see flow sheet  Therapy: no  PT/OT/Lymphedema: no  Follow up as advised per Dr Chong

## 2024-02-05 NOTE — PROGRESS NOTES
PATIENT:    Desiree Galdamez     DATE:      2/5/2024     TIME: 8:43 AM     Subjective:     Desiree Galdamez presents for follow-up of breast cancer. She is well and tolerating anastrazole every other day. She has no complaints.    The breast cancer is  Cancer Staging   Malignant neoplasm of lower-outer quadrant of female breast (HCC)  Staging form: Breast, AJCC 8th Edition  - Clinical: Stage IA (cT1, cN0, cM0, G2, ER+, DC+, HER2-) - Signed by Hazel Chong MD on 8/21/2019       She does not perform self breast exams.  She denies breast pain, masses, skin changes, nipple discharge, nipple retraction, or recent breast trauma bilaterally.      Patient's medications, allergies, past medical, surgical, social and family histories were reviewed and updated as appropriate.    Current Outpatient Medications on File Prior to Visit   Medication Sig Dispense Refill    traZODone (DESYREL) 50 MG tablet Take 1 tablet by mouth nightly as needed for Sleep      meloxicam (MOBIC) 15 MG tablet Take 1 tablet by mouth daily as needed for Pain      lidocaine (LIDODERM) 5 % Place 1 patch onto the skin daily as needed      Wheat Dextrin (BENEFIBER) POWD Take 4 g by mouth daily      estradiol (ESTRACE) 0.1 MG/GM vaginal cream Place 1 g vaginally Twice a Week      ammonium lactate (LAC-HYDRIN) 12 % lotion Apply 1 Dose topically daily      ibuprofen (ADVIL;MOTRIN) 200 MG tablet Take 1 tablet by mouth every 6 hours as needed      anastrozole (ARIMIDEX) 1 MG tablet Take 1 tablet by mouth every other day      docusate sodium (COLACE) 100 MG capsule Take 1 capsule by mouth daily as needed for Constipation      hydrocortisone (ANUSOL-HC) 25 MG suppository UNWRAP AND INSERT 1 SUPPOSITORIES RECTALLY TWICE A DAY AS NEEDED  0    ketoconazole (NIZORAL) 2 % shampoo USE UTD BID  5    Multiple Vitamins-Minerals (MULTIVITAMIN PO) Take by mouth      Cholecalciferol (VITAMIN D3) 2000 units CAPS Take 2 capsules by mouth       No current facility-administered

## 2024-02-05 NOTE — PATIENT INSTRUCTIONS
Patient Education        Lymphedema After Treatment for Breast Cancer: Care Instructions  Overview     Lymphedema is a buildup of fluid in the soft tissues of the body. It can happen after lymph nodes are removed during surgery or after radiation therapy. Lymph fluid usually moves freely throughout your body. But when lymph nodes have been removed, or the flow of lymph fluid is blocked by scar tissue from radiation, fluid can build up. This can cause swelling in your arm and nearby areas.  There isn't any known way to prevent lymphedema. But whether you are at risk of getting lymphedema or already have symptoms, there are things you can do that will help. This includes managing any swelling you may have.  Lymphedema can happen soon after breast cancer treatment. Or it may happen many years later. It may affect only part of your arm or hand. In some cases, it affects all of the arm. Make sure to follow these precautions even after you finish treatment. Do not ignore tightness or swelling in or around your arm or hand. You are less likely to have long-term problems if you get these symptoms treated right away.  Follow-up care is a key part of your treatment and safety. Be sure to make and go to all appointments, and call your doctor if you are having problems. It's also a good idea to know your test results and keep a list of the medicines you take.  How can you care for yourself at home?  Skin care    Keep your arm, hand, and armpit clean. Use a mild soap that does not dry out your skin.     Moisturize your skin often.     Take good care of the skin around your fingernails. Do not bite or cut your cuticles.     Ask your doctor how to handle any cuts, scratches, insect bites, or other injuries you may get.     Use sunscreen and insect repellent outdoors to protect your skin from sunburn and insect bites.     Use an electric razor if you shave your armpit. It's less likely to cut or irritate your skin.   Activity

## 2024-03-13 NOTE — PROGRESS NOTES
Patient ID: Fauzia Thomas is a 54 y.o. female.  Primary Care Provider: Jose Morris DO    Subjective    Referred by Dr. Moises Briscoe  Breast surgeon:  Dionne Sanchez MD  Medical Oncologist:  Dr. Leonel Foley    Ms. Fauzia Thomas has a personal history of invasive ductal carcinoma and underwent genetic testing via the 47-gene Common Hereditary Cancers panel from [employer]. A genetic cause for her cancer was not identified.    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  Her ovary pathology was unremarkable.  She had previously tested positive for HPV 18.  At one point a pap smear demonstrated atypical glands but preoperative colposcopic biopsies were unremarkable.  Her pathology was reviewed last week at Tumor Board conference.    CANCER MEDICAL HISTORY:  TYPE: left breast cancer  AGE OF DIAGNOSIS: 49  DIAGNOSIS AND TREATMENT: Physician was watching microcalcifications and two  cysts every 6 months with imaging. In July 2019, patient noted a new indentation in the skin, which led to a mammogram and biopsy. Pathology showed invasive ductal carcinoma, ER+. She underwent a lumpectomy with SLNB last Thursday. Oncotype is pending. Plan is for radiation therapy and endocrine therapy, unless oncotype is high.    Past Surgical History:    BREAST BIOPSY Left 07/29/2019    LAPAROSCOPY    LIVER BIOPSY    OVARIAN CYST REMOVAL    TUBAL LIGATION    PMH:  Healthy.  BMI 24.  Social:  Former smoker      Objective    Visit Vitals  /74 (BP Location: Right arm, Patient Position: Sitting, BP Cuff Size: Adult)   Pulse 76   Temp 36.4 °C (97.5 °F) (Temporal)   Resp 16        Interval history:  Patient is a 54 year old female with history of breast cancer s/p INDIRA/BSO 12/16/2019 for what was intended to be therapeutic reasons related to hormone ablation and breast cancer management  and an incidental finding was identified of stage Ia2 adenocarcinoma of the cervix.  Last pap 9/2023 was negative, HPV-.  Here for 6 month follow up.  Patient was using Estrace three times a week but has stopped, now noticing burning with urination again.  Patient is not currently sexually active. Mammogram and Breast MRI every 6 months, have been negative.  Has constipation, takes fiber.       Physical Exam:    Constitutional: Doing well. CHERIE  Eyes: PERRL  ENMT: Moist mucus membranes  Head/Neck: Supple. Symmetrical  Cardiovascular: Regular, rate and rhythm. 2+ equal pulses of the extremities  Respiratory/Thorax: CTA. RRR. Chest rise symmetrical.  Gastrointestinal: Non-distended, soft, non-tender  Genitourinary:   Normal external female genitalia. No vulvar lesions noted.  Atrophy at introitus.   Speculum exam: Smooth vaginal walls without lesions or masses. Vaginal cuff visualized without lesions.   Bimanual exam: Smooth vaginal wall without lesions or masses.  Surgically absent uterus, cervix, and adnexa.    Rectovaginal exam: smooth rectovaginal septum without lesions or masses  Musculoskeletal: ROM intact, no joint swelling, normal strength  Extremities: No edema  Neurological: Alert and oriented x 3. Pleasant and cooperative.  Lymphatic: No lymphadenopathy. No lymphedema  Psychological: Appropriate mood and behavior  Skin: Warm and dry, no lesions, no rashes    A complete review of systems was performed and all systems were normal except what is noted in the interval history.          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  CHERIE 4.5 years.        PLAN:  RX for Estrace sent, intravaginally three nights a week, using dime to nickel size amount on finger and applying  at specified area.  Do not use applicator.  F/U in 6 months or as needed  Pap next visit  Physical examination was within normal limits today.  She is currently CHERIE.  We reviewed signs and symptoms of possible recurrence with the patient and she will call our office should she experience any of these.

## 2024-03-20 ENCOUNTER — OFFICE VISIT (OUTPATIENT)
Dept: GYNECOLOGIC ONCOLOGY | Facility: CLINIC | Age: 54
End: 2024-03-20
Payer: COMMERCIAL

## 2024-03-20 VITALS
TEMPERATURE: 97.5 F | HEART RATE: 76 BPM | DIASTOLIC BLOOD PRESSURE: 74 MMHG | RESPIRATION RATE: 16 BRPM | WEIGHT: 160.72 LBS | SYSTOLIC BLOOD PRESSURE: 118 MMHG | BODY MASS INDEX: 25.28 KG/M2 | OXYGEN SATURATION: 95 %

## 2024-03-20 DIAGNOSIS — N90.5 VULVAR ATROPHY: Primary | ICD-10-CM

## 2024-03-20 DIAGNOSIS — C53.9 MALIGNANT NEOPLASM OF CERVIX, UNSPECIFIED SITE (MULTI): ICD-10-CM

## 2024-03-20 PROCEDURE — 1036F TOBACCO NON-USER: CPT | Performed by: NURSE PRACTITIONER

## 2024-03-20 PROCEDURE — 99214 OFFICE O/P EST MOD 30 MIN: CPT | Performed by: NURSE PRACTITIONER

## 2024-03-20 RX ORDER — ESTRADIOL 0.1 MG/G
CREAM VAGINAL
Qty: 42.5 G | Refills: 3 | Status: SHIPPED | OUTPATIENT
Start: 2024-03-20

## 2024-03-20 RX ORDER — ANASTROZOLE 1 MG/1
1 TABLET ORAL DAILY
COMMUNITY

## 2024-03-20 ASSESSMENT — PAIN SCALES - GENERAL: PAINLEVEL: 0-NO PAIN

## 2024-03-23 ENCOUNTER — HOSPITAL ENCOUNTER (EMERGENCY)
Age: 54
Discharge: HOME | End: 2024-03-23
Payer: COMMERCIAL

## 2024-03-23 ENCOUNTER — APPOINTMENT (OUTPATIENT)
Dept: GENERAL RADIOLOGY | Age: 54
End: 2024-03-23
Payer: COMMERCIAL

## 2024-03-23 VITALS
TEMPERATURE: 97.2 F | DIASTOLIC BLOOD PRESSURE: 81 MMHG | HEART RATE: 96 BPM | RESPIRATION RATE: 20 BRPM | SYSTOLIC BLOOD PRESSURE: 138 MMHG | OXYGEN SATURATION: 98 %

## 2024-03-23 DIAGNOSIS — S80.11XA CONTUSION OF RIGHT LOWER EXTREMITY, INITIAL ENCOUNTER: Primary | ICD-10-CM

## 2024-03-23 PROCEDURE — 99283 EMERGENCY DEPT VISIT LOW MDM: CPT

## 2024-03-23 PROCEDURE — 73590 X-RAY EXAM OF LOWER LEG: CPT

## 2024-03-23 ASSESSMENT — ENCOUNTER SYMPTOMS
COUGH: 0
NAUSEA: 0
VOMITING: 0
COLOR CHANGE: 0
SHORTNESS OF BREATH: 0

## 2024-03-23 ASSESSMENT — PAIN - FUNCTIONAL ASSESSMENT: PAIN_FUNCTIONAL_ASSESSMENT: 0-10

## 2024-03-23 ASSESSMENT — PAIN DESCRIPTION - ORIENTATION: ORIENTATION: RIGHT

## 2024-03-23 ASSESSMENT — PAIN SCALES - GENERAL: PAINLEVEL_OUTOF10: 10

## 2024-03-23 ASSESSMENT — PAIN DESCRIPTION - PAIN TYPE: TYPE: ACUTE PAIN

## 2024-03-23 ASSESSMENT — PAIN DESCRIPTION - LOCATION: LOCATION: LEG

## 2024-03-23 NOTE — ED TRIAGE NOTES
Pt to ED due to right leg pain. Pt states she hit her right leg on a pole at home and is having pain.

## 2024-03-23 NOTE — ED NOTES
Walking  boot applied to pt right foot. Pt states she has much pain relief already, no distress noted at this time.

## 2024-03-23 NOTE — ED PROVIDER NOTES
[facility] ED  eMERGENCYdEPARTMENT eNCOUnter        Pt Name: Desiree Galdamez  MRN: [id number]  Birthdate 1970of evaluation: 3/23/2024  Provider:UZAIR Torres CNP  11:13 AM EDT    CHIEF COMPLAINT       Chief Complaint   Patient presents with    Leg Pain     Right leg pain x1 week         HISTORY OF PRESENT ILLNESS  (Location/Symptom, Timing/Onset, Context/Setting, Quality, Duration, Modifying Factors, Severity.)   Desiree Galdamez is a 54 y.o. female who presents to the emergency department right x 2 and half weeks.  Patient reports that she did have an injury where she ran into a metal pole with her leg.  Denies any numbness tingling.  There is no bruising or deformity noted.  Pain is moderate.    HPI    Nursing Notes were reviewed and I agree.    REVIEW OF SYSTEMS    (2-9 systems for level 4, 10 or more for level 5)     Review of Systems   Constitutional:  Negative for activity change and fatigue.   HENT:  Negative for voice change.    Respiratory:  Negative for cough and shortness of breath.    Cardiovascular:  Negative for chest pain.   Gastrointestinal:  Negative for nausea and vomiting.   Musculoskeletal:  Positive for gait problem. Negative for arthralgias, back pain, neck pain and neck stiffness.   Skin:  Negative for color change, pallor, rash and wound.   Neurological:  Negative for headaches.        as noted above the remainder of the review of systems was reviewed and negative.       PAST MEDICAL HISTORY     Past Medical History:   Diagnosis Date    Abnormal Pap smear of cervix     BRCA1 negative     BRCA2 negative     Breast cancer (HCC) 08/2019    left    Cancer (HCC)     breast    Endometrial cancer (HCC) 2019    History of therapeutic radiation 2019    left breast    Irritable bowel          SURGICAL HISTORY       Past Surgical History:   Procedure Laterality Date    BREAST BIOPSY Left 07/29/2019    BREAST BIOPSY Left 8/29/2019    LEFT LUMPECTOMY AND  SENTINEL LYMPH NODE BIOPSY  understanding of education, instruction. Patient is agreeable to plan. Patient discharged home in stable condition      Medical Decision Making  Amount and/or Complexity of Data Reviewed  Radiology: ordered.       Coding     PROCEDURES:    Procedures      FINAL IMPRESSION      1. Contusion of right lower extremity, initial encounter          DISPOSITION/PLAN   DISPOSITION Decision To Discharge 03/23/2024 12:34:10 PM      PATIENT REFERRED TO:  Aj Ng DO  [id number] [facility] [id number] [phone]    In 1 week      [facility] [place] [id number] [phone]          DISCHARGE MEDICATIONS:  Discharge Medication List as of 3/23/2024 12:36 PM          (Please note that portions of this note were completed with a voice recognition program.  Efforts were made to edit the dictations but occasionally words are mis-transcribed.)    UZAIR Torres - CNP    Supervising Physician Chandrika Xiao APRN - CNP  03/23/24 [id number]

## 2024-04-03 ENCOUNTER — HOSPITAL ENCOUNTER (OUTPATIENT)
Dept: WOMENS IMAGING | Age: 54
Discharge: HOME | End: 2024-04-05
Attending: SURGERY
Payer: COMMERCIAL

## 2024-04-03 VITALS — BODY MASS INDEX: 26.62 KG/M2 | WEIGHT: 170 LBS

## 2024-04-03 DIAGNOSIS — Z12.31 SCREENING MAMMOGRAM FOR BREAST CANCER: ICD-10-CM

## 2024-04-03 PROCEDURE — 77063 BREAST TOMOSYNTHESIS BI: CPT

## 2024-06-03 ENCOUNTER — TELEPHONE (OUTPATIENT)
Dept: GYNECOLOGIC ONCOLOGY | Facility: HOSPITAL | Age: 54
End: 2024-06-03
Payer: COMMERCIAL

## 2024-06-03 DIAGNOSIS — N90.5 VULVAR ATROPHY: Primary | ICD-10-CM

## 2024-06-05 ENCOUNTER — HOSPITAL ENCOUNTER (OUTPATIENT)
Dept: GENERAL RADIOLOGY | Age: 54
Discharge: HOME | End: 2024-06-07
Payer: COMMERCIAL

## 2024-06-05 ENCOUNTER — TRANSCRIBE ORDERS (OUTPATIENT)
Dept: GENERAL RADIOLOGY | Age: 54
End: 2024-06-05

## 2024-06-05 DIAGNOSIS — M79.671 RIGHT FOOT PAIN: ICD-10-CM

## 2024-06-05 DIAGNOSIS — M79.671 RIGHT FOOT PAIN: Primary | ICD-10-CM

## 2024-06-05 PROCEDURE — 73630 X-RAY EXAM OF FOOT: CPT

## 2024-08-05 ENCOUNTER — OFFICE VISIT (OUTPATIENT)
Dept: SURGERY | Age: 54
End: 2024-08-05
Payer: COMMERCIAL

## 2024-08-05 VITALS
TEMPERATURE: 97.6 F | HEART RATE: 76 BPM | OXYGEN SATURATION: 97 % | SYSTOLIC BLOOD PRESSURE: 124 MMHG | HEIGHT: 67 IN | DIASTOLIC BLOOD PRESSURE: 70 MMHG | WEIGHT: 159.2 LBS | BODY MASS INDEX: 24.99 KG/M2 | RESPIRATION RATE: 14 BRPM

## 2024-08-05 DIAGNOSIS — Z17.0 CARCINOMA OF LOWER-OUTER QUADRANT OF LEFT BREAST IN FEMALE, ESTROGEN RECEPTOR POSITIVE (HCC): Primary | ICD-10-CM

## 2024-08-05 DIAGNOSIS — Z12.31 SCREENING MAMMOGRAM FOR BREAST CANCER: ICD-10-CM

## 2024-08-05 DIAGNOSIS — C50.512 CARCINOMA OF LOWER-OUTER QUADRANT OF LEFT BREAST IN FEMALE, ESTROGEN RECEPTOR POSITIVE (HCC): Primary | ICD-10-CM

## 2024-08-05 DIAGNOSIS — R10.2 PELVIC PAIN: ICD-10-CM

## 2024-08-05 PROCEDURE — G8427 DOCREV CUR MEDS BY ELIG CLIN: HCPCS | Performed by: SURGERY

## 2024-08-05 PROCEDURE — 1036F TOBACCO NON-USER: CPT | Performed by: SURGERY

## 2024-08-05 PROCEDURE — 3017F COLORECTAL CA SCREEN DOC REV: CPT | Performed by: SURGERY

## 2024-08-05 PROCEDURE — 99214 OFFICE O/P EST MOD 30 MIN: CPT | Performed by: SURGERY

## 2024-08-05 PROCEDURE — G8420 CALC BMI NORM PARAMETERS: HCPCS | Performed by: SURGERY

## 2024-08-05 RX ORDER — TRAZODONE HYDROCHLORIDE 100 MG/1
100 TABLET ORAL NIGHTLY PRN
COMMUNITY
Start: 2024-08-01

## 2024-08-05 RX ORDER — SERTRALINE HYDROCHLORIDE 25 MG/1
25 TABLET, FILM COATED ORAL DAILY
COMMUNITY
Start: 2024-07-24

## 2024-08-05 ASSESSMENT — ENCOUNTER SYMPTOMS
ABDOMINAL PAIN: 0
VOMITING: 0
SORE THROAT: 0
COUGH: 0
SHORTNESS OF BREATH: 0
NAUSEA: 0
COLOR CHANGE: 0
CHEST TIGHTNESS: 0

## 2024-08-05 NOTE — PROGRESS NOTES
Reason for today's visit:  6 month follow up left breast cancer, continues on Anastrazole    Palpates a breast change? no  Nipple discharge: no  Breast Pain:  by scar tissue left breast feels tight  Breast Mass: no  Swelling in either upper extremity? no    Wellness:    Self breast self exams: yes - at times  Regular exercise routine: no but active at work  Following Lymphedema awareness/precautions: yes   Managing stress:yes   Stress level on a scale of 1 - 10: 6, feels the zoloft is helping her with her stress, has a counselor at the [facility]    Instruction:    Follow up per provider  Imaging per provider  Monthly self breast exams  Healthy diet  Exercise program  Lymphedema precautions/awareness    Other:    Requisitions needed:no  Bras/prosthesis: no  Compression Sleeve/glove: no  Lymphedema Measurements: see flow sheet  Therapy: no  PT/OT/Lymphedema: no  Follow up as advised per Dr Chong        8/5/2024     8:24 AM 2/5/2024     8:24 AM 4/4/2022     8:43 AM 8/23/2021    10:13 AM 1/25/2021    10:23 AM   Lymphedema Screening   LEFT 10 cm (Hand) 20 21 20.5 19.9 21   LEFT 20 cm (Wrist) 16.5 16 16 16.2 17   LEFT 30 cm (Forearm) 21 22 18.5 18.5 24.5   LEFT 60 cm (Upper Arm) 28.5 29 25.5 27 28

## 2024-08-05 NOTE — PROGRESS NOTES
PATIENT:    Desiree Galdamez     DATE:      8/5/2024     TIME: 4:07 PM     Subjective:     Desiree Galdamez presents for follow-up of breast cancer. On anastrazole every other day. She complains of pelvic pain - will refer to gyn    The breast cancer is  Cancer Staging   Malignant neoplasm of lower-outer quadrant of female breast (HCC)  Staging form: Breast, AJCC 8th Edition  - Clinical: Stage IA (cT1, cN0, cM0, G2, ER+, RI+, HER2-) - Signed by Hazel Chong MD on 8/21/2019       She does perform self breast exams.  She denies breast pain, masses, skin changes, nipple discharge, nipple retraction, or recent breast trauma bilaterally.      Patient's medications, allergies, past medical, surgical, social and family histories were reviewed and updated as appropriate.    Current Outpatient Medications on File Prior to Visit   Medication Sig Dispense Refill    sertraline (ZOLOFT) 25 MG tablet Take 1 tablet by mouth daily      traZODone (DESYREL) 100 MG tablet Take 1 tablet by mouth nightly as needed      lidocaine (LIDODERM) 5 % Place 1 patch onto the skin daily as needed      Wheat Dextrin (BENEFIBER) POWD Take 4 g by mouth daily      estradiol (ESTRACE) 0.1 MG/GM vaginal cream Place 1 g vaginally Twice a Week      ammonium lactate (LAC-HYDRIN) 12 % lotion Apply 1 Dose topically daily      ibuprofen (ADVIL;MOTRIN) 200 MG tablet Take 1 tablet by mouth every 6 hours as needed      anastrozole (ARIMIDEX) 1 MG tablet Take 1 tablet by mouth every other day      docusate sodium (COLACE) 100 MG capsule Take 1 capsule by mouth daily as needed for Constipation      hydrocortisone (ANUSOL-HC) 25 MG suppository UNWRAP AND INSERT 1 SUPPOSITORIES RECTALLY TWICE A DAY AS NEEDED  0    ketoconazole (NIZORAL) 2 % shampoo USE UTD BID  5    Multiple Vitamins-Minerals (MULTIVITAMIN PO) Take by mouth      Cholecalciferol (VITAMIN D3) 2000 units CAPS Take 2 capsules by mouth       No current facility-administered medications on file prior to

## 2024-09-11 ENCOUNTER — HOSPITAL ENCOUNTER (OUTPATIENT)
Dept: MRI IMAGING | Age: 54
Discharge: HOME | End: 2024-09-13
Attending: SURGERY
Payer: COMMERCIAL

## 2024-09-11 DIAGNOSIS — Z91.89 AT HIGH RISK FOR BREAST CANCER: ICD-10-CM

## 2024-09-11 PROCEDURE — A9577 INJ MULTIHANCE: HCPCS | Performed by: SURGERY

## 2024-09-11 PROCEDURE — 6360000004 HC RX CONTRAST MEDICATION: Performed by: SURGERY

## 2024-09-11 PROCEDURE — C8908 MRI W/O FOL W/CONT, BREAST,: HCPCS

## 2024-09-11 RX ADMIN — GADOBENATE DIMEGLUMINE 20 ML: 529 INJECTION, SOLUTION INTRAVENOUS at 08:56

## 2024-09-17 NOTE — PROGRESS NOTES
Patient ID: Fauzia Thomas is a 54 y.o. female.  Primary Care Provider: Jose Morris DO    Subjective    Referred by Dr. Moises Briscoe  Breast surgeon:  Dionne Sanchez MD  Medical Oncologist:  Dr. Leonel Foley    Ms. Fauzia Thomas has a personal history of invasive ductal carcinoma and underwent genetic testing via the 47-gene Common Hereditary Cancers panel from [employer]. A genetic cause for her cancer was not identified.    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  Her ovary pathology was unremarkable.  She had previously tested positive for HPV 18.  At one point a pap smear demonstrated atypical glands but preoperative colposcopic biopsies were unremarkable.  Her pathology was reviewed last week at Tumor Board conference.    CANCER MEDICAL HISTORY:  TYPE: left breast cancer  AGE OF DIAGNOSIS: 49  DIAGNOSIS AND TREATMENT: Physician was watching microcalcifications and two  cysts every 6 months with imaging. In July 2019, patient noted a new indentation in the skin, which led to a mammogram and biopsy. Pathology showed invasive ductal carcinoma, ER+. She underwent a lumpectomy with SLNB last Thursday. Oncotype is pending. Plan is for radiation therapy and endocrine therapy, unless oncotype is high.    Past Surgical History:    BREAST BIOPSY Left 07/29/2019    LAPAROSCOPY    LIVER BIOPSY    OVARIAN CYST REMOVAL    TUBAL LIGATION    PMH:  Healthy.  BMI 24.  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  Last pap 9/2023 was negative, HPV-.  Here for 6 month follow up and pap.  Patient has been having some constipation, taking fiber supplements and stool softeners.  Patient having urinary leakage.  Patient states she is having RLQ pain, notices often, feels \"buzzing\" sensation.  She has been having sharp shooting pains with orgasms.   Patient denies any bleeding.  Breast MRI was negative.           Physical Exam:    Constitutional: Doing well. CHERIE  Eyes: PERRL  ENMT: Moist mucus membranes  Head/Neck: Supple. Symmetrical  Cardiovascular: Regular, rate and rhythm. 2+ equal pulses of the extremities  Respiratory/Thorax: CTA. RRR. Chest rise symmetrical.  Gastrointestinal: Non-distended, soft, non-tender  Genitourinary:   Normal external female genitalia. No vulvar lesions noted.  Atrophy at introitus, inferior portion   Speculum exam: Smooth vaginal walls without lesions or masses. Vaginal cuff visualized without lesions.   Bimanual exam: Smooth vaginal wall without lesions or masses.  Surgically absent uterus, cervix, and adnexa.    Rectovaginal exam: smooth rectovaginal septum without lesions or masses  Musculoskeletal: ROM intact, no joint swelling, normal strength  Extremities: No edema  Neurological: Alert and oriented x 3. Pleasant and cooperative.  Lymphatic: No lymphadenopathy. No lymphedema  Psychological: Appropriate mood and behavior  Skin: Warm and dry, no lesions, no rashes    A complete review of systems was performed and all systems were normal except what is noted in the interval history.          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 Pelvic pain.        PLAN:  " Pap today, F/U results  CT A/P, F/U results  If negative will refer to pelvic floor PT  F/U in 6 months or as needed

## 2024-09-18 ENCOUNTER — OFFICE VISIT (OUTPATIENT)
Dept: GYNECOLOGIC ONCOLOGY | Facility: CLINIC | Age: 54
End: 2024-09-18
Payer: COMMERCIAL

## 2024-09-18 VITALS
RESPIRATION RATE: 16 BRPM | SYSTOLIC BLOOD PRESSURE: 123 MMHG | HEART RATE: 78 BPM | DIASTOLIC BLOOD PRESSURE: 72 MMHG | BODY MASS INDEX: 25.01 KG/M2 | TEMPERATURE: 98.2 F | WEIGHT: 158.95 LBS | OXYGEN SATURATION: 95 %

## 2024-09-18 DIAGNOSIS — C53.0 MALIGNANT NEOPLASM OF ENDOCERVIX (MULTI): Primary | ICD-10-CM

## 2024-09-18 DIAGNOSIS — R10.2 PELVIC PAIN: ICD-10-CM

## 2024-09-18 PROCEDURE — 99214 OFFICE O/P EST MOD 30 MIN: CPT | Performed by: NURSE PRACTITIONER

## 2024-09-18 PROCEDURE — 1036F TOBACCO NON-USER: CPT | Performed by: NURSE PRACTITIONER

## 2024-09-18 RX ORDER — SERTRALINE HYDROCHLORIDE 50 MG/1
1 TABLET, FILM COATED ORAL
COMMUNITY
Start: 2024-08-28

## 2024-09-18 ASSESSMENT — PAIN SCALES - GENERAL: PAINLEVEL: 0-NO PAIN

## 2024-10-02 LAB
CYTOLOGY CMNT CVX/VAG CYTO-IMP: NORMAL
HPV HR 12 DNA GENITAL QL NAA+PROBE: NEGATIVE
HPV HR GENOTYPES PNL CVX NAA+PROBE: NEGATIVE
HPV16 DNA SPEC QL NAA+PROBE: NEGATIVE
HPV18 DNA SPEC QL NAA+PROBE: NEGATIVE
LAB AP HPV GENOTYPE QUESTION: YES
LAB AP HPV HR: NORMAL
LABORATORY COMMENT REPORT: NORMAL
PATH REPORT.TOTAL CANCER: NORMAL

## 2024-10-03 ENCOUNTER — TELEPHONE (OUTPATIENT)
Dept: GYNECOLOGIC ONCOLOGY | Facility: HOSPITAL | Age: 54
End: 2024-10-03
Payer: COMMERCIAL

## 2024-10-03 NOTE — TELEPHONE ENCOUNTER
Phoned patient to notify that pap results are negative/-HPV.   Patient is aware she is scheduled for CT on 10/10 and follow up phone visit with Stephanie Vicente CNP on 10/15/24.

## 2024-10-10 ENCOUNTER — HOSPITAL ENCOUNTER (OUTPATIENT)
Dept: RADIOLOGY | Facility: HOSPITAL | Age: 54
Discharge: HOME | End: 2024-10-10
Payer: COMMERCIAL

## 2024-10-10 DIAGNOSIS — C53.0 MALIGNANT NEOPLASM OF ENDOCERVIX (MULTI): ICD-10-CM

## 2024-10-10 DIAGNOSIS — R10.2 PELVIC PAIN: ICD-10-CM

## 2024-10-10 PROCEDURE — 74177 CT ABD & PELVIS W/CONTRAST: CPT

## 2024-10-10 PROCEDURE — 2550000001 HC RX 255 CONTRASTS: Performed by: NURSE PRACTITIONER

## 2024-10-14 NOTE — PROGRESS NOTES
Patient ID: Fauzia Thomas is a 54 y.o. female.  Primary Care Provider: DO Ghada Mcmahon    Referred by Dr. Moises Briscoe  Breast surgeon:  Dionne Sanchez MD  Medical Oncologist:  Dr. Leonel Foley    Ms. Fauzia Thomas has a personal history of invasive ductal carcinoma and underwent genetic testing via the 47-gene Common Hereditary Cancers panel from [employer]. A genetic cause for her cancer was not identified.    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  Her ovary pathology was unremarkable.  She had previously tested positive for HPV 18.  At one point a pap smear demonstrated atypical glands but preoperative colposcopic biopsies were unremarkable.  Her pathology was reviewed last week at Tumor Board conference.    CANCER MEDICAL HISTORY:  TYPE: left breast cancer  AGE OF DIAGNOSIS: 49  DIAGNOSIS AND TREATMENT: Physician was watching microcalcifications and two  cysts every 6 months with imaging. In July 2019, patient noted a new indentation in the skin, which led to a mammogram and biopsy. Pathology showed invasive ductal carcinoma, ER+. She underwent a lumpectomy with SLNB last Thursday. Oncotype is pending. Plan is for radiation therapy and endocrine therapy, unless oncotype is high.    Past Surgical History:    BREAST BIOPSY Left 07/29/2019    LAPAROSCOPY    LIVER BIOPSY    OVARIAN CYST REMOVAL    TUBAL LIGATION    PMH:  Healthy.  BMI 24.  Social:  Former smoker      Objective    Telehealth visit      Interval history:  Patient is a 54 year old female with history of breast cancer s/p INDIRA/BSO 12/16/2019 for what was intended to be therapeutic reasons related to hormone ablation and breast cancer management and an incidental finding was identified of stage Ia2 adenocarcinoma of the cervix.  Was having pelvic pain when last seen.  Had a CT A/P.       IMPRESSION:  1.  No acute process within the abdomen or pelvis.  2. Hepatic steatosis. Correlate with serum LFTs.  3. Cholelithiasis without evidence cholecystitis.  4. Large rectal stool burden. Correlate with symptoms of constipation.  5. Colonic diverticulosis without evidence of diverticulitis.  6. Remaining chronic and incidental findings are unchanged as  described above.    Pap 9/2024:  Negative, HPV-.      Patient states she is having constipation, this has been chronic issue.  She states she takes fiber daily and eats high fiber diet.   She states she has rectal prolapse she has not had managed.          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       PLAN:  Miralax daily  Stop oral fiber supplements  F/U in March as scheduled.        I performed this visit using real-time telehealth tools, including an audio/video connection between the patient and myself. I spent 8 minutes virtually with this patient and/or family. More than 50% of the time was spent in counseling and/or coordination of care.

## 2024-10-15 ENCOUNTER — TELEMEDICINE (OUTPATIENT)
Dept: GYNECOLOGIC ONCOLOGY | Facility: CLINIC | Age: 54
End: 2024-10-15
Payer: COMMERCIAL

## 2024-10-15 ENCOUNTER — APPOINTMENT (OUTPATIENT)
Dept: GYNECOLOGIC ONCOLOGY | Facility: CLINIC | Age: 54
End: 2024-10-15
Payer: COMMERCIAL

## 2024-10-15 DIAGNOSIS — C53.0 MALIGNANT NEOPLASM OF ENDOCERVIX (MULTI): Primary | ICD-10-CM

## 2024-10-15 PROCEDURE — 99213 OFFICE O/P EST LOW 20 MIN: CPT | Performed by: NURSE PRACTITIONER

## 2025-02-02 ENCOUNTER — PATIENT MESSAGE (OUTPATIENT)
Dept: SURGERY | Age: 55
End: 2025-02-02

## 2025-02-28 ENCOUNTER — OFFICE VISIT (OUTPATIENT)
Dept: ORTHOPEDIC SURGERY | Age: 55
End: 2025-02-28
Payer: COMMERCIAL

## 2025-02-28 ENCOUNTER — HOSPITAL ENCOUNTER (OUTPATIENT)
Dept: ORTHOPEDIC SURGERY | Age: 55
Discharge: HOME | End: 2025-02-28
Payer: COMMERCIAL

## 2025-02-28 ENCOUNTER — TELEPHONE (OUTPATIENT)
Dept: ENDOCRINOLOGY | Age: 55
End: 2025-02-28

## 2025-02-28 VITALS
WEIGHT: 159 LBS | HEIGHT: 67 IN | HEART RATE: 70 BPM | DIASTOLIC BLOOD PRESSURE: 60 MMHG | SYSTOLIC BLOOD PRESSURE: 110 MMHG | TEMPERATURE: 98.1 F | OXYGEN SATURATION: 98 % | BODY MASS INDEX: 24.96 KG/M2

## 2025-02-28 DIAGNOSIS — M67.431 GANGLION CYST OF WRIST, RIGHT: Primary | ICD-10-CM

## 2025-02-28 DIAGNOSIS — M25.531 RIGHT WRIST PAIN: ICD-10-CM

## 2025-02-28 PROCEDURE — G8427 DOCREV CUR MEDS BY ELIG CLIN: HCPCS | Performed by: PHYSICIAN ASSISTANT

## 2025-02-28 PROCEDURE — 99203 OFFICE O/P NEW LOW 30 MIN: CPT | Performed by: PHYSICIAN ASSISTANT

## 2025-02-28 PROCEDURE — 3017F COLORECTAL CA SCREEN DOC REV: CPT | Performed by: PHYSICIAN ASSISTANT

## 2025-02-28 PROCEDURE — 1036F TOBACCO NON-USER: CPT | Performed by: PHYSICIAN ASSISTANT

## 2025-02-28 PROCEDURE — G8420 CALC BMI NORM PARAMETERS: HCPCS | Performed by: PHYSICIAN ASSISTANT

## 2025-02-28 PROCEDURE — 73110 X-RAY EXAM OF WRIST: CPT

## 2025-02-28 RX ORDER — PREDNISONE 20 MG/1
20 TABLET ORAL DAILY
Qty: 7 TABLET | Refills: 0 | Status: SHIPPED | OUTPATIENT
Start: 2025-02-28 | End: 2025-03-07

## 2025-02-28 SDOH — HEALTH STABILITY: PHYSICAL HEALTH: ON AVERAGE, HOW MANY DAYS PER WEEK DO YOU ENGAGE IN MODERATE TO STRENUOUS EXERCISE (LIKE A BRISK WALK)?: 5 DAYS

## 2025-02-28 SDOH — HEALTH STABILITY: PHYSICAL HEALTH: ON AVERAGE, HOW MANY MINUTES DO YOU ENGAGE IN EXERCISE AT THIS LEVEL?: 20 MIN

## 2025-02-28 ASSESSMENT — ENCOUNTER SYMPTOMS
RESPIRATORY NEGATIVE: 1
EYES NEGATIVE: 1
GASTROINTESTINAL NEGATIVE: 1

## 2025-02-28 NOTE — PROGRESS NOTES
Apprehensive about cortisone use.    De Quervain's Tenosynovitis  She was treated with a cortisone injection for De Quervain's tenosynovitis.    SOCIAL HISTORY  - Works at [employer]         Review of Systems   Constitutional: Negative.    HENT: Negative.     Eyes: Negative.    Respiratory: Negative.     Gastrointestinal: Negative.    Genitourinary: Negative.    Musculoskeletal: Negative.    Psychiatric/Behavioral: Negative.            Results  Radiographs right wrist-no bony abnormality.  No fracture or dislocation.  Joint spaces are preserved.      Objective   Physical Exam  Constitutional:       Appearance: Normal appearance.   HENT:      Head: Normocephalic and atraumatic.      Mouth/Throat:      Mouth: Mucous membranes are moist.   Eyes:      Extraocular Movements: Extraocular movements intact.   Musculoskeletal:      Cervical back: Normal range of motion.      Comments: Right wrist-soft tissue swelling volar aspect of the distal radius.  Finkelstein's is negative.  Good flexion and extension range of motion of the wrist.  Pain with radial deviation greater than ulnar deviation.  Sensation is intact distally to light touch.  Capillary refills brisk.   Skin:     General: Skin is warm and dry.   Neurological:      General: No focal deficit present.      Mental Status: She is oriented to person, place, and time.   Psychiatric:         Mood and Affect: Mood normal.     Patient will be placed in a Comfort Cool brace.  Will proceed with MRI scan and surgical referral.  Patient would like to wait till the end of the school year for surgery.  Physical Exam           On this date 2/28/2025 I have spent 35 minutes reviewing previous notes, test results and face to face with the patient discussing the diagnosis and importance of compliance with the treatment plan as well as documenting on the day of the visit.    The patient (or guardian, if applicable) and other individuals in attendance with the patient were advised

## 2025-02-28 NOTE — TELEPHONE ENCOUNTER
I called the patient.  Spoke with her explaining to her that there is possibility that even if we excised the cyst it may return.  I did recommend that we proceed with MRI scan and discuss surgical excision with results of the MRI scan.

## 2025-02-28 NOTE — TELEPHONE ENCOUNTER
Patient called because she wanted to know if surgery is done is there a possibility of the cyst returning please advise

## 2025-03-14 NOTE — PROGRESS NOTES
Patient ID: Fauzia Thomas is a 55 y.o. female.  Primary Care Provider: DO Ghada Mcmahon    Referred by Dr. Moises Briscoe  Breast surgeon:  Dionne Sanchez MD  Medical Oncologist:  Dr. Leonel Foley    Ms. Fauzia Thomas has a personal history of invasive ductal carcinoma and underwent genetic testing via the 47-gene Common Hereditary Cancers panel from [employer]. A genetic cause for her cancer was not identified.    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  Her ovary pathology was unremarkable.  She had previously tested positive for HPV 18.  At one point a pap smear demonstrated atypical glands but preoperative colposcopic biopsies were unremarkable.  Her pathology was reviewed last week at Tumor Board conference.    CANCER MEDICAL HISTORY:  TYPE: left breast cancer  AGE OF DIAGNOSIS: 49  DIAGNOSIS AND TREATMENT: Physician was watching microcalcifications and two  cysts every 6 months with imaging. In July 2019, patient noted a new indentation in the skin, which led to a mammogram and biopsy. Pathology showed invasive ductal carcinoma, ER+. She underwent a lumpectomy with SLNB last Thursday. Oncotype is pending. Plan is for radiation therapy and endocrine therapy, unless oncotype is high.    Past Surgical History:    BREAST BIOPSY Left 07/29/2019    LAPAROSCOPY    LIVER BIOPSY    OVARIAN CYST REMOVAL    TUBAL LIGATION    PMH:  Healthy.  BMI 24.  Social:  Former smoker      Objective    There were no vitals taken for this visit.         Interval history:  Patient is a 55 year old female with history of breast cancer s/p INDIRA/BSO 12/16/2019 for what was intended to be therapeutic reasons related to hormone ablation and breast cancer management and an incidental finding was identified of stage Ia2 adenocarcinoma of the cervix.  Last pap 9/2024 was  negative, HPV-.   Here for 6 month follow up.            Physical Exam:    Constitutional: Doing well. CHERIE  Eyes: PERRL  ENMT: Moist mucus membranes  Head/Neck: Supple. Symmetrical  Cardiovascular: Regular, rate and rhythm. 2+ equal pulses of the extremities  Respiratory/Thorax: CTA. RRR. Chest rise symmetrical.  Gastrointestinal: Non-distended, soft, non-tender  Genitourinary:   Normal external female genitalia. No vulvar lesions noted.  Atrophy at introitus, inferior portion   Speculum exam: Smooth vaginal walls without lesions or masses. Vaginal cuff visualized without lesions.   Bimanual exam: Smooth vaginal wall without lesions or masses.  Surgically absent uterus, cervix, and adnexa.    Rectovaginal exam: smooth rectovaginal septum without lesions or masses  Musculoskeletal: ROM intact, no joint swelling, normal strength  Extremities: No edema  Neurological: Alert and oriented x 3. Pleasant and cooperative.  Lymphatic: No lymphadenopathy. No lymphedema  Psychological: Appropriate mood and behavior  Skin: Warm and dry, no lesions, no rashes    A complete review of systems was performed and all systems were normal except what is noted in the interval history.          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       PLAN:

## 2025-03-19 ENCOUNTER — APPOINTMENT (OUTPATIENT)
Dept: GYNECOLOGIC ONCOLOGY | Facility: CLINIC | Age: 55
End: 2025-03-19
Payer: COMMERCIAL

## 2025-04-04 ENCOUNTER — HOSPITAL ENCOUNTER (OUTPATIENT)
Dept: WOMENS IMAGING | Age: 55
Discharge: HOME | End: 2025-04-04
Attending: SURGERY
Payer: COMMERCIAL

## 2025-04-04 DIAGNOSIS — Z12.31 SCREENING MAMMOGRAM FOR BREAST CANCER: ICD-10-CM

## 2025-04-04 PROCEDURE — 77063 BREAST TOMOSYNTHESIS BI: CPT

## 2025-04-08 ENCOUNTER — RESULTS FOLLOW-UP (OUTPATIENT)
Age: 55
End: 2025-04-08

## 2025-04-21 ENCOUNTER — OFFICE VISIT (OUTPATIENT)
Age: 55
End: 2025-04-21
Payer: COMMERCIAL

## 2025-04-21 VITALS
HEART RATE: 75 BPM | DIASTOLIC BLOOD PRESSURE: 79 MMHG | WEIGHT: 155.2 LBS | SYSTOLIC BLOOD PRESSURE: 122 MMHG | HEIGHT: 67 IN | BODY MASS INDEX: 24.36 KG/M2 | OXYGEN SATURATION: 98 % | RESPIRATION RATE: 13 BRPM | TEMPERATURE: 98.1 F

## 2025-04-21 DIAGNOSIS — C50.512 CARCINOMA OF LOWER-OUTER QUADRANT OF LEFT BREAST IN FEMALE, ESTROGEN RECEPTOR POSITIVE (HCC): Primary | ICD-10-CM

## 2025-04-21 DIAGNOSIS — Z91.89 AT HIGH RISK FOR BREAST CANCER: ICD-10-CM

## 2025-04-21 DIAGNOSIS — N64.9 BREAST DISORDER: ICD-10-CM

## 2025-04-21 DIAGNOSIS — Z17.0 CARCINOMA OF LOWER-OUTER QUADRANT OF LEFT BREAST IN FEMALE, ESTROGEN RECEPTOR POSITIVE (HCC): Primary | ICD-10-CM

## 2025-04-21 PROCEDURE — 3017F COLORECTAL CA SCREEN DOC REV: CPT | Performed by: SURGERY

## 2025-04-21 PROCEDURE — 1036F TOBACCO NON-USER: CPT | Performed by: SURGERY

## 2025-04-21 PROCEDURE — 99213 OFFICE O/P EST LOW 20 MIN: CPT | Performed by: SURGERY

## 2025-04-21 PROCEDURE — G8428 CUR MEDS NOT DOCUMENT: HCPCS | Performed by: SURGERY

## 2025-04-21 PROCEDURE — G8420 CALC BMI NORM PARAMETERS: HCPCS | Performed by: SURGERY

## 2025-04-21 PROCEDURE — 99214 OFFICE O/P EST MOD 30 MIN: CPT | Performed by: SURGERY

## 2025-04-21 RX ORDER — CONJUGATED ESTROGENS 0.62 MG/G
CREAM VAGINAL
COMMUNITY
Start: 2025-02-25

## 2025-04-21 ASSESSMENT — ENCOUNTER SYMPTOMS
SORE THROAT: 0
CHEST TIGHTNESS: 0
NAUSEA: 0
SHORTNESS OF BREATH: 0
COUGH: 0
VOMITING: 0
ABDOMINAL PAIN: 0
COLOR CHANGE: 0

## 2025-04-21 NOTE — PROGRESS NOTES
Reason for today's visit:  Cancer Follow Up        Palpates a breast change? no  Nipple discharge: no  Breast Pain: no  Breast Mass: no  Swelling in either upper extremity? no    Wellness:    Self breast self exams: yes -   Regular exercise routine: Yes- physical Job  Following Lymphedema awareness/precautions: yes -   Managing stress:yes -   Stress level on a scale of 1 - 10: 6    Instruction:       Follow up per provider  Imaging per provider  Monthly self breast exams  Healthy diet  Exercise program  Lymphedema precautions/awareness    Other:    Requisitions needed:no  Bras/prosthesis: no  Compression Sleeve/glove: no  Therapy: no  PT/OT/Lymphedema: no  Follow up as advised per Dr Chong            8/5/2024     8:24 AM 2/5/2024     8:24 AM 4/4/2022     8:43 AM 8/23/2021    10:13 AM 1/25/2021    10:23 AM   Lymphedema Screening   LEFT 10 cm (Hand) 20 21 20.5 19.9 21   LEFT 20 cm (Wrist) 16.5 16 16 16.2 17   LEFT 30 cm (Forearm) 21 22 18.5 18.5 24.5   LEFT 60 cm (Upper Arm) 28.5 29 25.5 27 28

## 2025-04-21 NOTE — PROGRESS NOTES
PATIENT:    Desiree Galdamez     DATE:      4/21/2025     TIME: 9:56 AM     Subjective:     Desiree Galdamez presents for follow-up of breast cancer.  She is well with no complaints     The breast cancer is Cancer Staging   Malignant neoplasm of lower-outer quadrant of female breast  Staging form: Breast, AJCC 8th Edition  - Clinical: Stage IA (cT1, cN0, cM0, G2, ER+, MN+, HER2-) - Signed by Hazel Chong MD on 8/21/2019       She does perform self breast exams.  She denies breast pain, masses, skin changes, nipple discharge, nipple retraction, or recent breast trauma bilaterally.      Patient's medications, allergies, past medical, surgical, social and family histories were reviewed and updated as appropriate.    Current Outpatient Medications on File Prior to Visit   Medication Sig Dispense Refill    PREMARIN 0.625 MG/GM CREA vaginal cream       sertraline (ZOLOFT) 25 MG tablet Take 1 tablet by mouth daily      traZODone (DESYREL) 100 MG tablet Take 1 tablet by mouth nightly as needed      lidocaine (LIDODERM) 5 % Place 1 patch onto the skin daily as needed      Wheat Dextrin (BENEFIBER) POWD Take 4 g by mouth daily      estradiol (ESTRACE) 0.1 MG/GM vaginal cream Place 1 g vaginally Twice a Week (Patient not taking: Reported on 4/21/2025)      ammonium lactate (LAC-HYDRIN) 12 % lotion Apply 1 Dose topically daily      ibuprofen (ADVIL;MOTRIN) 200 MG tablet Take 1 tablet by mouth every 6 hours as needed      anastrozole (ARIMIDEX) 1 MG tablet Take 1 tablet by mouth every other day      hydrocortisone (ANUSOL-HC) 25 MG suppository UNWRAP AND INSERT 1 SUPPOSITORIES RECTALLY TWICE A DAY AS NEEDED  0    ketoconazole (NIZORAL) 2 % shampoo USE UTD BID  5    Multiple Vitamins-Minerals (MULTIVITAMIN PO) Take by mouth      Cholecalciferol (VITAMIN D3) 2000 units CAPS Take 2 capsules by mouth       No current facility-administered medications on file prior to visit.        Any over the counter meds / herbal supplements /

## 2025-04-21 NOTE — PATIENT INSTRUCTIONS
[facility]    1)[facility] LOCATION    [address] ,[address]  [place], [place] [id number] [phone]  [facility]  Tuesday - Friday 9:00 a.m. - 5:00 p.m.  Saturday 9:00 a.m. - 1:00 p.m.  Closed Sunday & Monday  [facility] is conveniently located off [address] and [address], tucked behind [employer] in a one-story brick office building.      2)[facility] LOC[facility]    [address] [place] [id number] [phone] Email Us  [facility]  Tuesday - Friday 9:00 a.m. - 3:00 p.m.    Saturday APPOINTMENT ONLY    Closed Sunday & Monday    Conveniently located in the [facility].

## 2025-04-25 NOTE — PROGRESS NOTES
Patient ID: Fauzia Thomas is a 55 y.o. female.  Primary Care Provider: Jose Morris DO    Subjective    Referred by Dr. Moises Briscoe  Breast surgeon:  Dionne Sanchez MD  Medical Oncologist:  Dr. Leonel Foley    Ms. Fauzia Thomas has a personal history of invasive ductal carcinoma and underwent genetic testing via the 47-gene Common Hereditary Cancers panel from [employer]. A genetic cause for her cancer was not identified.    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  Her ovary pathology was unremarkable.  She had previously tested positive for HPV 18.  At one point a pap smear demonstrated atypical glands but preoperative colposcopic biopsies were unremarkable.  Her pathology was reviewed last week at Tumor Board conference.    CANCER MEDICAL HISTORY:  TYPE: left breast cancer  AGE OF DIAGNOSIS: 49  DIAGNOSIS AND TREATMENT: Physician was watching microcalcifications and two  cysts every 6 months with imaging. In July 2019, patient noted a new indentation in the skin, which led to a mammogram and biopsy. Pathology showed invasive ductal carcinoma, ER+. She underwent a lumpectomy with SLNB last Thursday. Oncotype is pending. Plan is for radiation therapy and endocrine therapy, unless oncotype is high.    Past Surgical History:    BREAST BIOPSY Left 07/29/2019    LAPAROSCOPY    LIVER BIOPSY    OVARIAN CYST REMOVAL    TUBAL LIGATION    PMH:  Healthy.  BMI 24.  Social:  Former smoker      Objective    Visit Vitals  /76 (BP Location: Right arm, Patient Position: Sitting, BP Cuff Size: Adult)   Pulse 73   Temp 36.4 °C (97.5 °F) (Temporal)   Resp 16        CT A/P 10/10/24:  IMPRESSION:  1.  No acute process within the abdomen or pelvis.  2. Hepatic steatosis. Correlate with serum LFTs.  3. Cholelithiasis without evidence cholecystitis.  4. Large rectal stool  burden. Correlate with symptoms of constipation.  5. Colonic diverticulosis without evidence of diverticulitis.  6. Remaining chronic and incidental findings are unchanged as  described above.    Interval history:  Patient is a 55 year old female with history of breast cancer s/p INDIRA/BSO 12/16/2019 for what was intended to be therapeutic reasons related to hormone ablation and breast cancer management and an incidental finding was identified of stage Ia2 adenocarcinoma of the cervix.  Last pap 9/2024 was negative, HPV-. She is here for a 6 month follow up.  Seen 6 months ago with abdominal pain, CT scan showed constipation.  Patient has resolved abdominal pain and constipation, using Miralax as needed. Patient also has stress incontinence and uses pads, using Premarin a few times a month when she remembers. Patient denies any vaginal bleeding, pink tinged discharge. Patient denies any diarrhea or any other bowel issues. Patient denies hematuria or any other bladder issues. Patient denies any new or worsening changes of the vulva. Patient is not sexually active . Patient denies any itching, or dryness. Mammogram UTD and followed by Breast surgeon with alternating MRIs.      Physical Exam:    Constitutional: Doing well. CHERIE  Eyes: PERRL  ENMT: Moist mucus membranes  Head/Neck: Supple. Symmetrical  Cardiovascular: Regular, rate and rhythm. 2+ equal pulses of the extremities  Respiratory/Thorax: CTA. RRR. Chest rise symmetrical.  Gastrointestinal: Non-distended, soft, non-tender  Genitourinary: Patient declined at this time  Extremities: No edema  Neurological: Alert and oriented x 3. Pleasant and cooperative.  Psychological: Appropriate mood and behavior  Skin: Warm and dry, no lesions, no rashes    A complete review of systems was performed and all systems were normal except what is noted in the interval history.        Assessment/Plan   Patient is a 55 year old female with history of breast cancer s/p INDIRA/BSO  12/16/2019 for what was intended to be therapeutic reasons related to hormone ablation and breast cancer management and an incidental finding was identified of stage Ia2 adenocarcinoma of the cervix. CHERIE 6 years, asymptomatic. Vulvar atrophy.      PLAN:    F/U in 6 months for Pap or as needed  Premarin refill sent  Physical examination was within normal limits today.  She is currently CHERIE.  We reviewed signs and symptoms of possible recurrence with the patient and she will call our office should she experience any of these.    ANITA Rodgers  I was present with the PA student who participated in the documentation of this note.  I have personally seen and re-examined the patient and performed the medical decision-making components (assessment and plan of care). I have reviewed the PA student documentation and verified the findings in the note as written with additions or exceptions as stated in the body of this note.

## 2025-04-30 ENCOUNTER — OFFICE VISIT (OUTPATIENT)
Dept: GYNECOLOGIC ONCOLOGY | Facility: CLINIC | Age: 55
End: 2025-04-30
Payer: COMMERCIAL

## 2025-04-30 VITALS
DIASTOLIC BLOOD PRESSURE: 76 MMHG | HEART RATE: 73 BPM | WEIGHT: 152.78 LBS | OXYGEN SATURATION: 97 % | TEMPERATURE: 97.5 F | RESPIRATION RATE: 16 BRPM | BODY MASS INDEX: 24.04 KG/M2 | SYSTOLIC BLOOD PRESSURE: 114 MMHG

## 2025-04-30 DIAGNOSIS — N90.5 VULVAR ATROPHY: ICD-10-CM

## 2025-04-30 DIAGNOSIS — C53.0 MALIGNANT NEOPLASM OF ENDOCERVIX (MULTI): Primary | ICD-10-CM

## 2025-04-30 PROCEDURE — 99214 OFFICE O/P EST MOD 30 MIN: CPT | Performed by: NURSE PRACTITIONER

## 2025-04-30 PROCEDURE — 1036F TOBACCO NON-USER: CPT | Performed by: NURSE PRACTITIONER

## 2025-04-30 ASSESSMENT — PAIN SCALES - GENERAL: PAINLEVEL_OUTOF10: 0-NO PAIN

## 2025-05-09 ENCOUNTER — HOSPITAL ENCOUNTER (OUTPATIENT)
Dept: MRI IMAGING | Age: 55
Discharge: HOME | End: 2025-05-11
Payer: COMMERCIAL

## 2025-05-09 DIAGNOSIS — M67.431 GANGLION CYST OF WRIST, RIGHT: ICD-10-CM

## 2025-05-09 PROCEDURE — 73221 MRI JOINT UPR EXTREM W/O DYE: CPT

## 2025-05-14 ENCOUNTER — OFFICE VISIT (OUTPATIENT)
Age: 55
End: 2025-05-14
Payer: COMMERCIAL

## 2025-05-14 VITALS
HEIGHT: 67 IN | HEART RATE: 83 BPM | OXYGEN SATURATION: 97 % | WEIGHT: 153 LBS | TEMPERATURE: 97.2 F | BODY MASS INDEX: 24.01 KG/M2

## 2025-05-14 DIAGNOSIS — M67.431 GANGLION CYST OF WRIST, RIGHT: Primary | ICD-10-CM

## 2025-05-14 PROCEDURE — 99214 OFFICE O/P EST MOD 30 MIN: CPT | Performed by: PHYSICIAN ASSISTANT

## 2025-05-14 PROCEDURE — G8427 DOCREV CUR MEDS BY ELIG CLIN: HCPCS | Performed by: PHYSICIAN ASSISTANT

## 2025-05-14 PROCEDURE — 99213 OFFICE O/P EST LOW 20 MIN: CPT | Performed by: PHYSICIAN ASSISTANT

## 2025-05-14 PROCEDURE — 3017F COLORECTAL CA SCREEN DOC REV: CPT | Performed by: PHYSICIAN ASSISTANT

## 2025-05-14 PROCEDURE — G8420 CALC BMI NORM PARAMETERS: HCPCS | Performed by: PHYSICIAN ASSISTANT

## 2025-05-14 PROCEDURE — 1036F TOBACCO NON-USER: CPT | Performed by: PHYSICIAN ASSISTANT

## 2025-05-14 ASSESSMENT — ENCOUNTER SYMPTOMS
RESPIRATORY NEGATIVE: 1
EYES NEGATIVE: 1
GASTROINTESTINAL NEGATIVE: 1

## 2025-05-14 NOTE — PROGRESS NOTES
Desiree Galdamez (:  1970) is a 55 y.o. female,Established patient, here for evaluation of the following chief complaint(s):  Follow-up (Review MRI of right wrist )         Assessment & Plan  Ganglion cyst of wrist, right   Chronic, at goal (stable), reviewed MRI scan, follow-up with Dr. Arriaza to discuss surgical excision of the ganglion           Assessment & Plan  1. Ganglion cyst:    Recommend surgical removal due to its location complicating injection treatment. Referred to Dr. Grant for surgery in 2-3 weeks, pending insurance approval. Post-surgery, a brace will be required for 2 weeks. The cyst may recur even after excision.    2. Carpal tunnel syndrome:    Carpal tunnel syndrome with loss of  strength. Discussed potential worsening and muscle loss in fingers. Suggested concurrent surgical treatment during ganglion cyst removal.    Follow-up:  Scheduled for 2 weeks post-surgery.        No follow-ups on file.       Subjective   History of Present Illness  The patient is a 55-year-old female presenting with a ganglion cyst.    Ganglion Cyst  She reports the cyst is small today with prominent wrist veins compared to the other wrist. Discomfort occurs during lifting, dishwashing, and cleaning. She is concerned about the cyst's impact on her carpal tunnel syndrome.  - Location: Wrist  - Character: Small cyst with prominent wrist veins  - Alleviating/Aggravating Factors: Discomfort during lifting, dishwashing, and cleaning  - Severity: Concerned about the impact on carpal tunnel syndrome    Carpal Tunnel Syndrome  She has carpal tunnel syndrome and previously declined testing due to nerve involvement concerns. Reports loss of  strength.  - Character: Loss of  strength  - Alleviating/Aggravating Factors: Declined testing due to nerve involvement concerns         Review of Systems   Constitutional: Negative.    HENT: Negative.     Eyes: Negative.    Respiratory: Negative.     Gastrointestinal:

## 2025-07-26 ENCOUNTER — HOSPITAL ENCOUNTER (OUTPATIENT)
Dept: GENERAL RADIOLOGY | Age: 55
Discharge: HOME | End: 2025-07-28
Attending: INTERNAL MEDICINE
Payer: COMMERCIAL

## 2025-07-26 ENCOUNTER — TRANSCRIBE ORDERS (OUTPATIENT)
Dept: GENERAL RADIOLOGY | Age: 55
End: 2025-07-26

## 2025-07-26 PROCEDURE — 77075 RADEX OSSEOUS SURVEY COMPL: CPT

## (undated) DEVICE — CONTAINER,SPECIMEN,OR STERILE,4OZ: Brand: MEDLINE

## (undated) DEVICE — 3M™ STERI-STRIP™ REINFORCED ADHESIVE SKIN CLOSURES, R1547, 1/2 IN X 4 IN (12 MM X 100 MM), 6 STRIPS/ENVELOPE: Brand: 3M™ STERI-STRIP™

## (undated) DEVICE — TUBING INSUF 03UM FLTR W LUERLOCK CPC CONN

## (undated) DEVICE — SEALER TISS L20CM DIA13MM ADV BPLR L CRV JAW OPN APPRCH

## (undated) DEVICE — CLIP INT SM TI EZ LD LIG SYS WECK HORZ

## (undated) DEVICE — COUNTER NDL 40 COUNT HLD 70 FOAM BLK ADH W/ MAG

## (undated) DEVICE — SUTURE MCRYL SZ 4-0 L27IN ABSRB UD L19MM PS-2 1/2 CIR PRIM Y426H

## (undated) DEVICE — COVER LT HNDL BLU PLAS

## (undated) DEVICE — 4-PORT MANIFOLD: Brand: NEPTUNE 2

## (undated) DEVICE — SHEET,DRAPE,53X77,STERILE: Brand: MEDLINE

## (undated) DEVICE — GOWN,SIRUS,POLYRNF,BRTHSLV,XLN/XL,20/CS: Brand: MEDLINE

## (undated) DEVICE — PENCIL SMOKE EVAC PUSH BUTTON COATED

## (undated) DEVICE — SPONGE,LAP,18"X18",DLX,XR,ST,5/PK,40/PK: Brand: MEDLINE

## (undated) DEVICE — COVER US PRB W4XL15IN GAM CRDLSS FLD

## (undated) DEVICE — LINER PAD CONTOUR SUPER PEACH 7X14IN

## (undated) DEVICE — LABEL MED MINI W/ MARKER

## (undated) DEVICE — YANKAUER,SMOOTH HANDLE,HIGH CAPACITY: Brand: MEDLINE INDUSTRIES, INC.

## (undated) DEVICE — SUTURE VCRL SZ 4-0 L18IN ABSRB UD L19MM PS-2 3/8 CIR PRIM J496H

## (undated) DEVICE — ELECTRODE PT RET AD L9FT HI MOIST COND ADH HYDRGEL CORDED

## (undated) DEVICE — DRAPE, LAVH, STERILE: Brand: MEDLINE

## (undated) DEVICE — PACK,SET UP,DRAPE: Brand: MEDLINE

## (undated) DEVICE — GOWN,AURORA,NONREINFORCED,LARGE: Brand: MEDLINE

## (undated) DEVICE — CHLORAPREP 26ML ORANGE

## (undated) DEVICE — SPONGE GZ W4XL4IN COT 12 PLY TYP VII WVN C FLD DSGN

## (undated) DEVICE — GOWN,AURORA,NONRNF,XL,30/CS: Brand: MEDLINE

## (undated) DEVICE — SPONGE GZ W4XL4IN RAYON POLY FILL CVR W/ NONWOVEN FAB

## (undated) DEVICE — Z INACTIVE USE 2735373 APPLICATOR FBR LAIN COT WOOD TIP ECONOMICAL

## (undated) DEVICE — HYPODERMIC SAFETY NEEDLE: Brand: MAGELLAN

## (undated) DEVICE — SHEARS ENDOSCP L36CM DIA5MM ULTRASONIC CRV TIP W/ ADV

## (undated) DEVICE — GAUZE,SPONGE,4"X4",16PLY,XRAY,STRL,LF: Brand: MEDLINE

## (undated) DEVICE — AGENT HEMSTAT 3GM PURIFIED PLNT STARCH PWD ABSRB ARISTA AH

## (undated) DEVICE — STANDARD HYPODERMIC NEEDLE,POLYPROPYLENE HUB: Brand: MONOJECT

## (undated) DEVICE — SYRINGE MED 10ML LUERLOCK TIP W/O SFTY DISP

## (undated) DEVICE — SUTURE VCRL SZ 0 L36IN ABSRB UD L36MM CT-1 1/2 CIR J946H

## (undated) DEVICE — Device

## (undated) DEVICE — PLASMABLADE PS210-030S 3.0S LOCK: Brand: PLASMABLADE™

## (undated) DEVICE — YANKAUER,BULB TIP,W/O VENT,RIGID,STERILE: Brand: MEDLINE

## (undated) DEVICE — TROCAR: Brand: KII FIOS FIRST ENTRY

## (undated) DEVICE — COVER,TABLE,44X90,STERILE: Brand: MEDLINE

## (undated) DEVICE — TRAY PREP DRY W/ PREM GLV 2 APPL 6 SPNG 2 UNDPD 1 OVERWRAP

## (undated) DEVICE — TIP IU L8CM DIA6.7MM BLU SIL FLX DISP RUMI II

## (undated) DEVICE — SYRINGE IRRIG 60ML SFT PLIABLE BLB EZ TO GRP 1 HND USE W/

## (undated) DEVICE — TUBING, SUCTION, 1/4" X 10', STRAIGHT: Brand: MEDLINE

## (undated) DEVICE — MATTRESS OVERLAY EGGCRATE 72X33IN FOAM PAD

## (undated) DEVICE — GLOVE ORANGE PI 8   MSG9080

## (undated) DEVICE — KIT,ANTI FOG,W/SPONGE & FLUID,SOFT PACK: Brand: MEDLINE

## (undated) DEVICE — SUTURE VCRL SZ 3-0 L27IN ABSRB UD L26MM SH 1/2 CIR J416H

## (undated) DEVICE — MARKER SURG SKIN GENTIAN VLT REG TIP W/ 6IN RUL

## (undated) DEVICE — TROCAR: Brand: KII SLEEVE

## (undated) DEVICE — APPLICATOR SURG XL L38CM FOR ARISTA ABSRB HEMSTAT FLEXITIP

## (undated) DEVICE — WARMER SCP 2 ANTIFOG LAP DISP

## (undated) DEVICE — TOTAL TRAY, DB, 100% SILI FOLEY, 16FR 10: Brand: MEDLINE

## (undated) DEVICE — TOWEL,OR,DSP,ST,BLUE,STD,4/PK,20PK/CS: Brand: MEDLINE

## (undated) DEVICE — INTENDED FOR TISSUE SEPARATION, AND OTHER PROCEDURES THAT REQUIRE A SHARP SURGICAL BLADE TO PUNCTURE OR CUT.: Brand: BARD-PARKER ® CARBON RIB-BACK BLADES

## (undated) DEVICE — GLOVE ORANGE PI 7 1/2   MSG9075

## (undated) DEVICE — GLOVE SURG SZ 65 THK91MIL LTX FREE SYN POLYISOPRENE

## (undated) DEVICE — PACK,LAPAROTOMY,NO GOWNS: Brand: MEDLINE